# Patient Record
Sex: MALE | Race: WHITE | NOT HISPANIC OR LATINO | Employment: FULL TIME | URBAN - METROPOLITAN AREA
[De-identification: names, ages, dates, MRNs, and addresses within clinical notes are randomized per-mention and may not be internally consistent; named-entity substitution may affect disease eponyms.]

---
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
## 2018-01-21 ENCOUNTER — HEALTH MAINTENANCE LETTER (OUTPATIENT)
Age: 18
End: 2018-01-21

## 2018-05-21 ENCOUNTER — MEDICAL CORRESPONDENCE (OUTPATIENT)
Dept: HEALTH INFORMATION MANAGEMENT | Facility: CLINIC | Age: 18
End: 2018-05-21

## 2018-05-21 ENCOUNTER — TRANSFERRED RECORDS (OUTPATIENT)
Dept: HEALTH INFORMATION MANAGEMENT | Facility: CLINIC | Age: 18
End: 2018-05-21

## 2018-06-06 DIAGNOSIS — E66.01 MORBID OBESITY DUE TO EXCESS CALORIES (H): ICD-10-CM

## 2018-06-06 NOTE — TELEPHONE ENCOUNTER
Left message for patient to call and schedule a follow up appointment with Dr. Moss in order to receive refill of Phentermine. Scheduling number left.

## 2018-06-06 NOTE — TELEPHONE ENCOUNTER
phentermine 15 MG capsule  Last Written Prescription Date:  10/23/17  Last Fill Quantity: 30,   # refills: 5  Last Office Visit : 10/23/17  Future Office visit:  None scheduled    Routing refill request to provider for review/approval because:  Drug not on the [facility], [facility] or [facility] refill protocol or controlled substance

## 2018-06-11 RX ORDER — PHENTERMINE HYDROCHLORIDE 15 MG/1
CAPSULE ORAL
Qty: 30 CAPSULE | Refills: 0 | Status: SHIPPED | OUTPATIENT
Start: 2018-06-11 | End: 2023-06-01

## 2020-03-10 ENCOUNTER — HEALTH MAINTENANCE LETTER (OUTPATIENT)
Age: 20
End: 2020-03-10

## 2020-12-27 ENCOUNTER — HEALTH MAINTENANCE LETTER (OUTPATIENT)
Age: 20
End: 2020-12-27

## 2021-04-24 ENCOUNTER — HEALTH MAINTENANCE LETTER (OUTPATIENT)
Age: 21
End: 2021-04-24

## 2021-10-09 ENCOUNTER — HEALTH MAINTENANCE LETTER (OUTPATIENT)
Age: 21
End: 2021-10-09

## 2021-10-18 NOTE — LETTER
"3/18/2017       RE: Max Schneider  [address]  [place] [address]     Dear Colleague,    Thank you for referring your patient, Max Schneider, to the [facility] MEDICAL WEIGHT MANAGEMENT at [facility]. Please see a copy of my visit note below.        New Medical Weight Management Consult    PATIENT:  Max Schneider  MRN:         [id number]  :         2000  LUIS MANUEL:         3/18/2017    Dear Colleagues,    I had the pleasure of seeing your patient, Max Schneider.  Full intake/assessment done to determine barriers to weight loss success and develop a treatment plan.  Max Schneider is a 16 year old male interested in treatment of medical problems associated with weight.  His weight today is 419 lbs 0 oz, Body mass index is 54.53 kg/(m^2)., and he has the following co-morbidities:     3/18/2017   I have the following co-morbidities associated with obesity: None of the above       Patient Goals Reviewed With Patient 3/18/2017   I am interested in attaining a healthier weight to diminish current health problems related to co-morbid conditions: Yes   I am interested in attaining a healthier weight in order to prevent future health problems: Yes       Referring Provider 3/18/2017   Please name the provider who referred you to Medical Weight Management.  If you do not know, please answer: \"I Don't Know\". dilcia lowery       Wt Readings from Last 4 Encounters:   17 (!) 190.1 kg (419 lb) (>99 %)*     * Growth percentiles are based on CDC 2-20 Years data.       Weight History Reviewed With Patient 3/18/2017   How concerned are you about your weight? Somewhat Concerned   Would you describe your weight gain as gradual? Yes   I became overweight: As a Teenager   The following factors have contributed to my weight gain:  A Health Crisis/Stress, Eating Wrong Types of Food, Eating Too Much, Lack of Exercise, Genetic (Runs in the Family)   I have tried the following methods to lose " weight: Watching Portions or Calories, Exercise   I have the following family history of obesity/being overweight:  My father is overweight       Diet Recall Reviewed With Patient 3/18/2017   How many glasses of juice do you drink in a typical day? 6   How many of glasses of milk do you drink in a typical day? 2   How many 8oz glasses of sugar containing drinks such as Boris-Aid/sweet tea do you drink in a day? 0   How many cans/bottles of sugar pop/soda/tea/sports drinks do you drink in a day? 4   How many cans/bottles of diet pop/soda/tea or sports drink do you drink in a day? 2   How often do you have a drink of alcohol? Never       Eating Habits Reviewed With Patient 3/18/2017   Generally, my meals include foods like these: bread, pasta, rice, potatoes, corn, crackers, sweet dessert, pop, or juice. Almost Everyday   Generally, my meals include foods like these: fried meats, brats, burgers, french fries, pizza, cheese, chips, or ice cream. A Few Times a Week   Eat fast food (like [employer], [employer], [employer]). A Few Times a Week   Eat at a buffet or sit-down restaurant. Never   Eat most of my meals in front of the TV or computer. Everyday   Often skip meals, eat at random times, have no regular eating times. Almost Everyday   Rarely sit down for a meal but snack or graze throughout.  Everyday   Eat extra snacks between meals. Almost Everyday   Eat most of my food at the end of the day. A Few Times a Week   Eat in the middle of the night or wake up at night to eat. Never   Eat extra snacks to prevent or correct low blood sugar. Never   Eat to prevent acid reflux or stomach pain. Never   Worry about not having enough food to eat. Never   Have you been to the food shelf at least a few times this year? No   I eat when I am depressed, stressed, anxious, or bored. Everyday   I eat when I am happy or as a reward. A Few Times a Week   I feel hungry all the time even if I just have eaten. Half of the Week   Feeling  full is important to me. Never   Once I start eating, it is hard to stop. Almost Everyday   I finish all the food on my plate even if I am already full. Everyday   I can't resist eating delicious food or walk past the good food/smell. Never   I eat/snack without noticing that I am eating. Almost Everyday   I eat when I am preparing the meal. Almost Everyday   I eat more than usual when I see others eating. Never   I have trouble not eating sweets, ice cream, cookies, or chips if they are around the house. Never   I think about food all day. Never   Please list any other foods you crave? spicy food   I feel out of control when eating. Monthly   I eat a large amount of food, like a loaf of bread, a box of cookies, a pint/quart of ice cream, all at once. Never   I eat a large amount of food even when I am not hungry. Everyday   I eat rapidly. Everyday   I eat alone because I feel embarrassed and do not want others to see how much I have eaten. Never   I eat until I am uncomfortably full. Almost Everyday   I feel bad, disgusted, or guilty after I overeat. Monthly   I make myself vomit what I have eaten or use laxatives to get rid of food. Never       Activity/Exercise History Reviewed With Patient 3/18/2017   How much of a typical 12 hour day do you spend sitting? Most of the Day   How much of a typical 12 hour day do you spend lying down? Less Than Half the Day   How much of a typical day do you spend walking/standing? Less Than Half the Day   How many hours (not including work) do you spend on the TV/Video Games/Computer/Tablet/Phone? 6 Hours or More       PAST MEDICAL HISTORY:  No past medical history on file.    Work/Social History Reviewed With Patient 3/18/2017   My employment status is: Part-Time   My job is:    How much of your job is spent on the computer or phone? Less Than 50%   What is your marital status? Single   If in a relationship, is your significant other overweight? N/A   Do you have  "children? No   If you have children, are they overweight? N/A       Mental Health History Reviewed With Patient 3/18/2017   Have you ever been physically or sexually abused? No   How often in the past 2 weeks have you felt little interest or pleasure in doing things? More Than Half the Days   Over the past 2 weeks how often have you felt down, depressed, or hopeless? Nearly Everyday       No flowsheet data found.    MEDICATIONS:   No current outpatient prescriptions on file.       ALLERGIES:   No Known Allergies    PHYSICAL EXAM:  BP (!) 144/97  Pulse 98  Ht 1.867 m (6' 1.5\")  Wt (!) 190.1 kg (419 lb)  SpO2 96%  BMI 54.53 kg/m2   A & O x 3  HEENT: NCAT, mucous membranes moist  Respirations unlabored  Location of obesity: Central Obesity    ASSESSMENT:  Max is a patient with early onset super morbid obesity with significant element of familial/genetic influence and without current health consequences. He does need aggressive weight loss plan due to very severe weight.      Max Schneider eats a high carb diet, eats a high fat diet, uses food as mood management, eats most meals in front of TV, tends to snack/graze throughout day, rarely sitting to eat a true meal, has a disorganized meal pattern and drinks many calories.    His problem is complicated by a hunger disorder and unhelpful lifestyle choices    His ability to lose weight is impacted by lack of confidence.    PLAN:    Volumetrics eating plan  Meal planning - stop liquid calories, work on not snacking    Strong genetic or hunger disorder  Ancillary testing:  N/A.  Food Plan:  Volumetrics.  Activity Plan:  Activity journal.  Supplementary:  N/A.  Medication:  The patient will begin medication in pursuit of improved medical status as influenced by body weight. He will start Qsymia.  There is a mutual understanding of the goals and risks of this therapy. The patient is in agreement. He is educated on dosage regimen and possible side effects.    RTC:    12 " weeks.  30/45 minutes spent on counseling and education    Sincerely,    Alonso Moss MD           Improved.

## 2022-02-14 ENCOUNTER — VIRTUAL VISIT (OUTPATIENT)
Dept: ENDOCRINOLOGY | Facility: CLINIC | Age: 22
End: 2022-02-14
Payer: COMMERCIAL

## 2022-02-14 VITALS — HEIGHT: 75 IN | BODY MASS INDEX: 39.17 KG/M2 | WEIGHT: 315 LBS

## 2022-02-14 DIAGNOSIS — E66.01 MORBID OBESITY (H): Primary | ICD-10-CM

## 2022-02-14 PROCEDURE — 99203 OFFICE O/P NEW LOW 30 MIN: CPT | Mod: GT | Performed by: INTERNAL MEDICINE

## 2022-02-14 RX ORDER — PHENTERMINE HYDROCHLORIDE 15 MG/1
15 CAPSULE ORAL EVERY MORNING
Qty: 30 CAPSULE | Refills: 5 | Status: SHIPPED | OUTPATIENT
Start: 2022-02-14 | End: 2022-05-16

## 2022-02-14 RX ORDER — TOPIRAMATE 25 MG/1
TABLET, FILM COATED ORAL
Qty: 90 TABLET | Refills: 11 | Status: SHIPPED | OUTPATIENT
Start: 2022-02-14 | End: 2023-08-28

## 2022-02-14 ASSESSMENT — PAIN SCALES - GENERAL: PAINLEVEL: NO PAIN (0)

## 2022-02-14 ASSESSMENT — MIFFLIN-ST. JEOR: SCORE: 3416.65

## 2022-02-14 NOTE — LETTER
2022       RE: Max Schneider  [address] [place] [address]     Dear Colleague,    Thank you for referring your patient, Max Schneider, to the [facility] at [facility]. Please see a copy of my visit note below.    Max is a 21 year old who is being evaluated via a billable video visit.      How would you like to obtain your AVS? Mail a copy  If the video visit is dropped, the invitation should be resent by: Send to e-mail at: [email or web address]  Will anyone else be joining your video visit? No    Video-Visit Details    Type of service:  Video Visit    Start: 2022 11:05 am  Stop: 2022 11:28 am    Originating Location (pt. Location): Home    Distant Location (provider location):  [facility]     Platform used for Video Visit: [employer]        [facility] Weight Management Consult    PATIENT:  Max Schneider  MRN:         [id number]  :         2000  LUIS MANUEL:         2022    Dear Farzaneh James PA-C,    I had the pleasure of seeing your patient, Max Schneider.  Full intake/assessment done to determine barriers to weight loss success and develop a treatment plan.  Max Schneider is a 21 year old male interested in treatment of medical problems associated with weight.  His weight today is 515 lbs 0 oz, Body mass index is 64.37 kg/m ., and he has the following co-morbidities:     2022   I have the following health issues associated with obesity: High Blood Pressure, Sleep Apnea, Lymphedema   I have the following symptoms associated with obesity: Depression, Fatigue       Patient Goals 2022   I am interested in having a healthier weight to diminish current health problems: Yes   I am interested in having a healthier weight in order to prevent future health problems: Yes   I am interested in having a healthier weight in order to have a future surgery:  "No       Referring Provider 2/13/2022   Please name the provider who referred you to Medical Weight Management.  If you do not know, please answer: \"I Don't Know\". Farzaneh James       Wt Readings from Last 4 Encounters:   02/14/22 (!) 233.6 kg (515 lb)   10/23/17 (!) 174.6 kg (385 lb) (>99 %, Z= 3.78)*   03/18/17 (!) 190.1 kg (419 lb) (>99 %, Z= 3.90)*     * Growth percentiles are based on [facility] (Boys, 2-20 Years) data.       Weight History 2/13/2022   How concerned are you about your weight? Very Concerned   Would you describe your weight gain as gradual? Yes   I became overweight: As a Teenager   The following factors have contributed to my weight gain:  Mental Health Issues, Eating Wrong Types of Food, Eating Too Much, Lack of Exercise, Genetic (Runs in the Family), Stress   I have tried the following methods to lose weight: Watching Portions or Calories, Exercise, Medications, Fasting   Please list the medications you have tried.  Phentermine and Topiramate   My lowest weight since age 16 was: 330   My highest weight since age 16 was: 514   The most weight I have ever lost was: (lbs) 514   I have the following family history of obesity/being overweight:  My father is overweight   Has anyone in your family had weight loss surgery? No   How has your weight changed over the last year?  Gained   How many pounds? 50-75       Diet Recall 2/13/2022   For breakfast, I typically eat: -   For lunch, I typically eat: -   For supper, I typically eat: -   For snack(s), I typically eat: -   Glass juice/day 2   Glass milk/day 0   Glass sugary drink/day 1   How many cans/bottles of sugar pop/soda/tea/sports drinks do you drink in a day? 2   Diet drink/day -   Alcohol Monthly or Less   Drinks/day 3-4       Eating Habits 2/13/2022   Generally, my meals include foods like these: bread, pasta, rice, potatoes, corn, crackers, sweet dessert, pop, or juice. A Few Times a Week   Generally, my meals include foods like these: fried meats, " brats, burgers, french fries, pizza, cheese, chips, or ice cream. A Few Times a Week   Eat fast food (like [employer], [employer], Taco Bell). Less Than Weekly   Eat at a buffet or sit-down restaurant. A Few Times a Week   Eat most of my meals in front of the TV or computer. Everyday   Often skip meals, eat at random times, have no regular eating times. A Few Times a Week   Rarely sit down for a meal but snack or graze throughout.  A Few Times a Week   Eat extra snacks between meals. A Few Times a Week   Eat most of my food at the end of the day. A Few Times a Week   Eat in the middle of the night or wake up at night to eat. Never   Eat extra snacks to prevent or correct low blood sugar. Never   Eat to prevent acid reflux or stomach pain. Never   Worry about not having enough food to eat. Never   Have you been to the food shelf at least a few times this year? Yes   I eat when I am depressed. A Few Times a Week   I eat when I am stressed. Less Than Weekly   I eat when I am bored. A Few Times a Week   I eat when I am anxious. Never   I eat when I am happy or as a reward. A Few Times a Week   I feel hungry all the time even if I just have eaten. Once a Week   Feeling full is important to me. Less Than Weekly   I finish all the food on my plate even if I am already full. Almost Everyday   I can't resist eating delicious food or walk past the good food/smell. Once a Week   I eat/snack without noticing that I am eating. Never   I eat when I am preparing the meal. Never   I eat more than usual when I see others eating. Less Than Weekly   I have trouble not eating sweets, ice cream, cookies, or chips if they are around the house. Never   I think about food all day. Less Than Weekly   What foods, if any, do you crave? None   Please list any other foods you crave? -       Activity/Exercise History 2/13/2022   How much of a typical 12 hour day do you spend sitting? Most of the Day   How much of a typical 12 hour day do you  spend lying down? Less Than Half the Day   How much of a typical day do you spend walking/standing? Less Than Half the Day   How many hours (not including work) do you spend on the TV/Video Games/Computer/Tablet/Phone? 6 Hours or More   How many times a week are you active for the purpose of exercise? Never   What keeps you from being more active? Shortness of Breath, Unsure What To Do, Worried People Will Look At Me   How many total minutes do you spend doing some activity for the purpose of exercising when you exercise? More Than 30 Minutes       PAST MEDICAL HISTORY:  No past medical history on file.    Work/Social History Reviewed With Patient 2/13/2022   My employment status is: Full-Time   My job is:    How much of your job is spent on the computer or phone? 50%   How many hours do you spend commuting to work daily?  12-15 minutes.   What is your marital status? Single   If in a relationship, is your significant other overweight? -   Do you have children? No   If you have children, are they overweight? -   Who do you live with?  My mother and step-father   Are they supportive of your health goals? No   Who does the food shopping?  We all do, we take turns purchasing foods. I am the only person who tries to buy healthier items.       Mental Health History Reviewed With Patient 2/13/2022   Have you ever been physically or sexually abused? No   If yes, do you feel that the abuse is affecting your weight? -   If yes, would you like to talk to a counselor about the abuse? -   How often in the past 2 weeks have you felt little interest or pleasure in doing things? Nearly Everyday   Over the past 2 weeks how often have you felt down, depressed, or hopeless? More Than Half the Days       Sleep History Reviewed With Patient 2/13/2022   How many hours do you sleep at night? 6   Do you think that you snore loudly or has anybody ever heard you snore loudly (louder than talking or so loud it can be heard  "behind a shut door)? Yes   Has anyone seen or heard you stop breathing during your sleep? Yes   Do you often feel tired, fatigued, or sleepy during the day? Yes   Do you have a TV/Computer in your bedroom? Yes       MEDICATIONS:   Current Outpatient Medications   Medication Sig Dispense Refill     phentermine 15 MG capsule TAKE 1 CAPSULE BY MOUTH EVERY MORNING (Patient not taking: Reported on 2/14/2022) 30 capsule 0     topiramate (TOPAMAX) 25 MG tablet Take 3 tablets (75 mg) by mouth daily (Patient not taking: Reported on 2/14/2022) 90 tablet 5       ALLERGIES:   No Known Allergies    PHYSICAL EXAM:  Ht 1.905 m (6' 3\")   Wt (!) 233.6 kg (515 lb)   BMI 64.37 kg/m     A & O x 3  HEENT: NCAT, mucous membranes moist  Respirations unlabored  Location of obesity: Mixed Obesity    ASSESSMENT:  Max is a patient with mature onset severe morbid obesity with significant element of familial/genetic influence and with current health consequences. He does need aggressive weight loss plan due to High Blood Pressure, Sleep Apnea, Lymphedema.  Last seen 2017, was then on phentermine and topiramate and lost 100 lb. Stopped due to insurance decision that his health was not important, so no coverage.    Max Schneider eats a high carb diet, eats a high fat diet, eats fast food once or more per week and has a disorganized meal pattern.    His problem is complicated by a hunger disorder and strong craving/reward pathways    His ability to lose weight is impacted by lack of confidence.    PLAN:    Volumetrics eating plan  Meal planning    Strong genetic or hunger disorder  Ancillary testing:  N/A.  Food Plan:  Volumetrics and High protein/low carbohydrate.  Activity Plan:  Activity journal.  Supplementary:  N/A.  Medication:  The patient will begin medication in pursuit of improved medical status as influenced by body weight. He will start phentermine and topiramate. Patient was made aware that topiramate is not approved for the " treatment of obesity.  There is a mutual understanding of the goals and risks of this therapy. The patient is in agreement. He is educated on dosage regimen and possible side effects.    RTC:    12 weeks.    Sincerely,  Alonso Moss MD

## 2022-02-14 NOTE — NURSING NOTE
"(   Chief Complaint   Patient presents with     New Patient     New weight management consult.    )    ( Weight: (!) 515 lb )  ( Height: 6' 3\" )  ( BMI (Calculated): 64.37 )  (   )  ( Cumulative weight loss (lbs): 0 )  (   )  (   )  (   )  (   )  ( There is no problem list on file for this patient.   )  (   Current Outpatient Medications   Medication Sig Dispense Refill     phentermine 15 MG capsule TAKE 1 CAPSULE BY MOUTH EVERY MORNING (Patient not taking: Reported on 2/14/2022) 30 capsule 0     topiramate (TOPAMAX) 25 MG tablet Take 3 tablets (75 mg) by mouth daily (Patient not taking: Reported on 2/14/2022) 90 tablet 5    )  ( Diabetes Eval:    )    ( Pain Eval:  No Pain (0) )    ( Wound Eval:       )    (   History   Smoking Status     Never Smoker   Smokeless Tobacco     Never Used    )    ( Signed By:  Lenora Duncan CMA; February 14, 2022; 10:38 AM )    "

## 2022-02-14 NOTE — PROGRESS NOTES
"    New Medical Weight Management Consult    PATIENT:  Max Schneider  MRN:         [id number]  :         2000  LUIS MANUEL:         2022    Dear Farzaneh James PA-C,    I had the pleasure of seeing your patient, Max Schneider.  Full intake/assessment done to determine barriers to weight loss success and develop a treatment plan.  Max Schneider is a 21 year old male interested in treatment of medical problems associated with weight.  His weight today is 515 lbs 0 oz, Body mass index is 64.37 kg/m ., and he has the following co-morbidities:     2022   I have the following health issues associated with obesity: High Blood Pressure, Sleep Apnea, Lymphedema   I have the following symptoms associated with obesity: Depression, Fatigue       Patient Goals 2022   I am interested in having a healthier weight to diminish current health problems: Yes   I am interested in having a healthier weight in order to prevent future health problems: Yes   I am interested in having a healthier weight in order to have a future surgery: No       Referring Provider 2022   Please name the provider who referred you to Medical Weight Management.  If you do not know, please answer: \"I Don't Know\". Farzaneh Jmaes       Wt Readings from Last 4 Encounters:   20 (!) 233.6 kg (515 lb)   10/23/17 (!) 174.6 kg (385 lb) (>99 %, Z= 3.78)*   17 (!) 190.1 kg (419 lb) (>99 %, Z= 3.90)*     * Growth percentiles are based on CDC (Boys, 2-20 Years) data.       Weight History 2022   How concerned are you about your weight? Very Concerned   Would you describe your weight gain as gradual? Yes   I became overweight: As a Teenager   The following factors have contributed to my weight gain:  Mental Health Issues, Eating Wrong Types of Food, Eating Too Much, Lack of Exercise, Genetic (Runs in the Family), Stress   I have tried the following methods to lose weight: Watching Portions or Calories, Exercise, Medications, Fasting   Please " list the medications you have tried.  Phentermine and Topiramate   My lowest weight since age 16 was: 330   My highest weight since age 16 was: 514   The most weight I have ever lost was: (lbs) 514   I have the following family history of obesity/being overweight:  My father is overweight   Has anyone in your family had weight loss surgery? No   How has your weight changed over the last year?  Gained   How many pounds? 50-75       Diet Recall 2/13/2022   For breakfast, I typically eat: -   For lunch, I typically eat: -   For supper, I typically eat: -   For snack(s), I typically eat: -   Glass juice/day 2   Glass milk/day 0   Glass sugary drink/day 1   How many cans/bottles of sugar pop/soda/tea/sports drinks do you drink in a day? 2   Diet drink/day -   Alcohol Monthly or Less   Drinks/day 3-4       Eating Habits 2/13/2022   Generally, my meals include foods like these: bread, pasta, rice, potatoes, corn, crackers, sweet dessert, pop, or juice. A Few Times a Week   Generally, my meals include foods like these: fried meats, brats, burgers, french fries, pizza, cheese, chips, or ice cream. A Few Times a Week   Eat fast food (like [employer], [employer], Taco Bell). Less Than Weekly   Eat at a buffet or sit-down restaurant. A Few Times a Week   Eat most of my meals in front of the TV or computer. Everyday   Often skip meals, eat at random times, have no regular eating times. A Few Times a Week   Rarely sit down for a meal but snack or graze throughout.  A Few Times a Week   Eat extra snacks between meals. A Few Times a Week   Eat most of my food at the end of the day. A Few Times a Week   Eat in the middle of the night or wake up at night to eat. Never   Eat extra snacks to prevent or correct low blood sugar. Never   Eat to prevent acid reflux or stomach pain. Never   Worry about not having enough food to eat. Never   Have you been to the food shelf at least a few times this year? Yes   I eat when I am depressed. A  Few Times a Week   I eat when I am stressed. Less Than Weekly   I eat when I am bored. A Few Times a Week   I eat when I am anxious. Never   I eat when I am happy or as a reward. A Few Times a Week   I feel hungry all the time even if I just have eaten. Once a Week   Feeling full is important to me. Less Than Weekly   I finish all the food on my plate even if I am already full. Almost Everyday   I can't resist eating delicious food or walk past the good food/smell. Once a Week   I eat/snack without noticing that I am eating. Never   I eat when I am preparing the meal. Never   I eat more than usual when I see others eating. Less Than Weekly   I have trouble not eating sweets, ice cream, cookies, or chips if they are around the house. Never   I think about food all day. Less Than Weekly   What foods, if any, do you crave? None   Please list any other foods you crave? -       Activity/Exercise History 2/13/2022   How much of a typical 12 hour day do you spend sitting? Most of the Day   How much of a typical 12 hour day do you spend lying down? Less Than Half the Day   How much of a typical day do you spend walking/standing? Less Than Half the Day   How many hours (not including work) do you spend on the TV/Video Games/Computer/Tablet/Phone? 6 Hours or More   How many times a week are you active for the purpose of exercise? Never   What keeps you from being more active? Shortness of Breath, Unsure What To Do, Worried People Will Look At Me   How many total minutes do you spend doing some activity for the purpose of exercising when you exercise? More Than 30 Minutes       PAST MEDICAL HISTORY:  No past medical history on file.    Work/Social History Reviewed With Patient 2/13/2022   My employment status is: Full-Time   My job is:    How much of your job is spent on the computer or phone? 50%   How many hours do you spend commuting to work daily?  12-15 minutes.   What is your marital status? Single   If  "in a relationship, is your significant other overweight? -   Do you have children? No   If you have children, are they overweight? -   Who do you live with?  My mother and step-father   Are they supportive of your health goals? No   Who does the food shopping?  We all do, we take turns purchasing foods. I am the only person who tries to buy healthier items.       Mental Health History Reviewed With Patient 2/13/2022   Have you ever been physically or sexually abused? No   If yes, do you feel that the abuse is affecting your weight? -   If yes, would you like to talk to a counselor about the abuse? -   How often in the past 2 weeks have you felt little interest or pleasure in doing things? Nearly Everyday   Over the past 2 weeks how often have you felt down, depressed, or hopeless? More Than Half the Days       Sleep History Reviewed With Patient 2/13/2022   How many hours do you sleep at night? 6   Do you think that you snore loudly or has anybody ever heard you snore loudly (louder than talking or so loud it can be heard behind a shut door)? Yes   Has anyone seen or heard you stop breathing during your sleep? Yes   Do you often feel tired, fatigued, or sleepy during the day? Yes   Do you have a TV/Computer in your bedroom? Yes       MEDICATIONS:   Current Outpatient Medications   Medication Sig Dispense Refill     phentermine 15 MG capsule TAKE 1 CAPSULE BY MOUTH EVERY MORNING (Patient not taking: Reported on 2/14/2022) 30 capsule 0     topiramate (TOPAMAX) 25 MG tablet Take 3 tablets (75 mg) by mouth daily (Patient not taking: Reported on 2/14/2022) 90 tablet 5       ALLERGIES:   No Known Allergies    PHYSICAL EXAM:  Ht 1.905 m (6' 3\")   Wt (!) 233.6 kg (515 lb)   BMI 64.37 kg/m     A & O x 3  HEENT: NCAT, mucous membranes moist  Respirations unlabored  Location of obesity: Mixed Obesity    ASSESSMENT:  Max is a patient with mature onset severe morbid obesity with significant element of familial/genetic " influence and with current health consequences. He does need aggressive weight loss plan due to High Blood Pressure, Sleep Apnea, Lymphedema.  Last seen 2017, was then on phentermine and topiramate and lost 100 lb. Stopped due to insurance decision that his health was not important, so no coverage.    Max Schneider eats a high carb diet, eats a high fat diet, eats fast food once or more per week and has a disorganized meal pattern.    His problem is complicated by a hunger disorder and strong craving/reward pathways    His ability to lose weight is impacted by lack of confidence.    PLAN:    Volumetrics eating plan  Meal planning    Strong genetic or hunger disorder  Ancillary testing:  N/A.  Food Plan:  Volumetrics and High protein/low carbohydrate.  Activity Plan:  Activity journal.  Supplementary:  N/A.  Medication:  The patient will begin medication in pursuit of improved medical status as influenced by body weight. He will start phentermine and topiramate. Patient was made aware that topiramate is not approved for the treatment of obesity.  There is a mutual understanding of the goals and risks of this therapy. The patient is in agreement. He is educated on dosage regimen and possible side effects.    RTC:    12 weeks.    Sincerely,  Alonso Moss MD

## 2022-02-14 NOTE — PROGRESS NOTES
Max is a 21 year old who is being evaluated via a billable video visit.      How would you like to obtain your AVS? Mail a copy  If the video visit is dropped, the invitation should be resent by: Send to e-mail at: [email or web address]  Will anyone else be joining your video visit? No    Video-Visit Details    Type of service:  Video Visit    Start: 02/14/2022 11:05 am  Stop: 02/14/2022 11:28 am    Originating Location (pt. Location): Home    Distant Location (provider location):  [facility] [place]     Platform used for Video Visit: [employer]

## 2022-05-16 ENCOUNTER — VIRTUAL VISIT (OUTPATIENT)
Dept: ENDOCRINOLOGY | Facility: CLINIC | Age: 22
End: 2022-05-16
Payer: COMMERCIAL

## 2022-05-16 VITALS — BODY MASS INDEX: 39.17 KG/M2 | WEIGHT: 315 LBS | HEIGHT: 75 IN

## 2022-05-16 DIAGNOSIS — E66.01 MORBID OBESITY (H): ICD-10-CM

## 2022-05-16 PROCEDURE — 99213 OFFICE O/P EST LOW 20 MIN: CPT | Mod: GT | Performed by: INTERNAL MEDICINE

## 2022-05-16 RX ORDER — PHENTERMINE HYDROCHLORIDE 15 MG/1
15 CAPSULE ORAL EVERY MORNING
Qty: 30 CAPSULE | Refills: 5 | Status: SHIPPED | OUTPATIENT
Start: 2022-05-16 | End: 2023-06-01

## 2022-05-16 ASSESSMENT — PAIN SCALES - GENERAL: PAINLEVEL: NO PAIN (0)

## 2022-05-16 NOTE — NURSING NOTE
"(   Chief Complaint   Patient presents with     RECHECK     Return MW    )    ( Weight: (!) 208.2 kg (459 lb) (Patient reported) )  ( Height: 190.5 cm (6' 3\") )  ( BMI (Calculated): 57.37 )  (   )  (   )  (   )  (   )  (   )  (   )    (   )  (   )  (   )  (   )  (   )  (   )  (   )    (   Patient Active Problem List   Diagnosis     Morbid obesity (H)    )  (   Current Outpatient Medications   Medication Sig Dispense Refill     loratadine (CLARITIN) 5 MG chewable tablet Take 5 mg by mouth daily       phentermine (ADIPEX-P) 15 MG capsule Take 1 capsule (15 mg) by mouth every morning 30 capsule 5     topiramate (TOPAMAX) 25 MG tablet 25 mg at bedtime for 1 week, 50 mg at bedtime for 1 week and 75 mg daily at bedtime thereafter 90 tablet 11     phentermine 15 MG capsule TAKE 1 CAPSULE BY MOUTH EVERY MORNING (Patient not taking: No sig reported) 30 capsule 0     topiramate (TOPAMAX) 25 MG tablet Take 3 tablets (75 mg) by mouth daily (Patient not taking: No sig reported) 90 tablet 5    )  ( Diabetes Eval:    )    ( Pain Eval:  No Pain (0) )    ( Wound Eval:       )    (   History   Smoking Status     Never Smoker   Smokeless Tobacco     Never Used    )    ( Signed By:  Shona Roach, EMT; May 16, 2022; 10:59 AM )    "

## 2022-05-16 NOTE — LETTER
"2022       RE: Max Schneider  [address] [place] [address]     Dear Colleague,    Thank you for referring your patient, Max Schneider, to the [facility] [place] at [facility]. Please see a copy of my visit note below.    Max is a 21 year old who is being evaluated via a billable video visit.      How would you like to obtain your AVS? MyChart  If the video visit is dropped, the invitation should be resent by: [phone]  Will anyone else be joining your video visit? No  During this virtual visit the patient is located in [place], patient verifies this as the location during the entirety of this visit.     Video-Visit Details    Type of service:  Video Visit    Start: 2022 11:40 am  Stop: 2022 11:54 am    Originating Location (pt. Location): Home    Distant Location (provider location):  [facility] [place]     Platform used for Video Visit: [employer] Medical Weight Management Note     Max Schneider  MRN:  [id number]  :  2000  LUIS MANUEL:  2022    Dear Farzaneh James PA-C,    I had the pleasure of seeing your patient Max Schneider.  He is a 21 year old male who I am continuing to see for treatment of obesity related to:     2022   I have the following health issues associated with obesity: High Blood Pressure, Sleep Apnea, Lymphedema   I have the following symptoms associated with obesity: Depression, Fatigue     CURRENT WEIGHT:   459 lbs 0 oz    Wt Readings from Last 4 Encounters:   22 (!) 208.2 kg (459 lb)   22 (!) 233.6 kg (515 lb)   10/23/17 (!) 174.6 kg (385 lb) (>99 %, Z= 3.78)*   17 (!) 190.1 kg (419 lb) (>99 %, Z= 3.90)*     * Growth percentiles are based on CDC (Boys, 2-20 Years) data.     Height:  6' 3\"  Body Mass Index:  Body mass index is 57.37 kg/m .  Vitals:  B/P: Data Unavailable, P: Data Unavailable    Initial consult weight " was 515 on 2/14/22.  Weight change since last seen on 2/14/2022 is down 56 pounds.   Total loss is 56 pounds.    INTERVAL HISTORY:  Feels he has done very well, changed his food life as discussed - low starches and cheese, more veg. Taking and tolerating meds and feels substantial benefit.    Diet Recall Review with Patient 2/13/2022   Do you typically eat breakfast? Yes   If you do eat breakfast, what types of food do you eat? [place] or leftovers   Do you typically eat lunch? Yes   If you do eat lunch, what types of food do you typically eat?  I usually order, it consists of a lot of different types. Usually I prefer Mexican Food.   Do you typically eat supper? Yes   If you do eat supper, what types of food do you typically eat? Something involving meat, either chicken or beef of some sort alongside a potential potato side dish. Also sometimes pasta.   Do you typically eat snacks? Yes   If you do snack, what types of food do you typically eat? I try to keep snacking healthy, but I like sun chips or fruit snacks.   Do you like vegetables?  Yes   Do you drink water? Yes   How many glasses of juice do you drink in a typical day? 2   How many of glasses of milk do you drink in a typical day? 0   If you do drink milk, what type? Whole   How many 8oz glasses of sugar containing drinks such as Boris-Aid/sweet tea do you drink in a day? 1   How many cans/bottles of sugar pop/soda/tea/sports drinks do you drink in a day? 2   How many cans/bottles of diet pop/soda/tea or sports drink do you drink in a day? -   How often do you have a drink of alcohol? Monthly or Less   If you do drink, how many drinks might you have in a day? 3-4     MEDICATIONS:   Current Outpatient Medications   Medication     loratadine (CLARITIN) 5 MG chewable tablet     phentermine (ADIPEX-P) 15 MG capsule     topiramate (TOPAMAX) 25 MG tablet     phentermine 15 MG capsule     topiramate (TOPAMAX) 25 MG tablet     No current facility-administered  medications for this visit.     Weight Loss Medication History Reviewed With Patient 5/16/2022   Which weight loss medications are you currently taking on a regular basis?  Phentermine, Topamax (topiramate)   Are you having any side effects from the weight loss medication that we have prescribed you? No     ASSESSMENT:   Maintain phentermine 15/d and topiramate 75 HS at current doses, encourage food life change.    FOLLOW-UP:    12 weeks.    Sincerely,  Alonso Moss MD

## 2022-05-16 NOTE — PROGRESS NOTES
"    Return Medical Weight Management Note     Max Schneider  MRN:  [id number]  :  2000  LUIS MANUEL:  2022    Dear Farzaneh James PA-C,    I had the pleasure of seeing your patient Max Schneider.  He is a 21 year old male who I am continuing to see for treatment of obesity related to:     2022   I have the following health issues associated with obesity: High Blood Pressure, Sleep Apnea, Lymphedema   I have the following symptoms associated with obesity: Depression, Fatigue     CURRENT WEIGHT:   459 lbs 0 oz    Wt Readings from Last 4 Encounters:   22 (!) 208.2 kg (459 lb)   22 (!) 233.6 kg (515 lb)   10/23/17 (!) 174.6 kg (385 lb) (>99 %, Z= 3.78)*   17 (!) 190.1 kg (419 lb) (>99 %, Z= 3.90)*     * Growth percentiles are based on CDC (Boys, 2-20 Years) data.     Height:  6' 3\"  Body Mass Index:  Body mass index is 57.37 kg/m .  Vitals:  B/P: Data Unavailable, P: Data Unavailable    Initial consult weight was 515 on 22.  Weight change since last seen on 2022 is down 56 pounds.   Total loss is 56 pounds.    INTERVAL HISTORY:  Feels he has done very well, changed his food life as discussed - low starches and cheese, more veg. Taking and tolerating meds and feels substantial benefit.    Diet Recall Review with Patient 2022   Do you typically eat breakfast? Yes   If you do eat breakfast, what types of food do you eat? [place] or leftovers   Do you typically eat lunch? Yes   If you do eat lunch, what types of food do you typically eat?  I usually order, it consists of a lot of different types. Usually I prefer Mexican Food.   Do you typically eat supper? Yes   If you do eat supper, what types of food do you typically eat? Something involving meat, either chicken or beef of some sort alongside a potential potato side dish. Also sometimes pasta.   Do you typically eat snacks? Yes   If you do snack, what types of food do you typically eat? I try to keep snacking healthy, but I like " sun chips or fruit snacks.   Do you like vegetables?  Yes   Do you drink water? Yes   How many glasses of juice do you drink in a typical day? 2   How many of glasses of milk do you drink in a typical day? 0   If you do drink milk, what type? Whole   How many 8oz glasses of sugar containing drinks such as Boris-Aid/sweet tea do you drink in a day? 1   How many cans/bottles of sugar pop/soda/tea/sports drinks do you drink in a day? 2   How many cans/bottles of diet pop/soda/tea or sports drink do you drink in a day? -   How often do you have a drink of alcohol? Monthly or Less   If you do drink, how many drinks might you have in a day? 3-4     MEDICATIONS:   Current Outpatient Medications   Medication     loratadine (CLARITIN) 5 MG chewable tablet     phentermine (ADIPEX-P) 15 MG capsule     topiramate (TOPAMAX) 25 MG tablet     phentermine 15 MG capsule     topiramate (TOPAMAX) 25 MG tablet     No current facility-administered medications for this visit.     Weight Loss Medication History Reviewed With Patient 5/16/2022   Which weight loss medications are you currently taking on a regular basis?  Phentermine, Topamax (topiramate)   Are you having any side effects from the weight loss medication that we have prescribed you? No     ASSESSMENT:   Maintain phentermine 15/d and topiramate 75 HS at current doses, encourage food life change.    FOLLOW-UP:    12 weeks.    Sincerely,  Alonso Moss MD

## 2022-05-16 NOTE — PROGRESS NOTES
Max is a 21 year old who is being evaluated via a billable video visit.      How would you like to obtain your AVS? MyChart  If the video visit is dropped, the invitation should be resent by: [phone]  Will anyone else be joining your video visit? No  During this virtual visit the patient is located in [place], patient verifies this as the location during the entirety of this visit.     Video-Visit Details    Type of service:  Video Visit    Start: 05/16/2022 11:40 am  Stop: 05/16/2022 11:54 am    Originating Location (pt. Location): Home    Distant Location (provider location):  [facility] [place]     Platform used for Video Visit: [employer]

## 2022-05-21 ENCOUNTER — HEALTH MAINTENANCE LETTER (OUTPATIENT)
Age: 22
End: 2022-05-21

## 2022-09-11 ENCOUNTER — HEALTH MAINTENANCE LETTER (OUTPATIENT)
Age: 22
End: 2022-09-11

## 2022-11-03 DIAGNOSIS — G47.33 OSA (OBSTRUCTIVE SLEEP APNEA): Primary | ICD-10-CM

## 2023-03-16 ENCOUNTER — VIRTUAL VISIT (OUTPATIENT)
Dept: SLEEP MEDICINE | Facility: CLINIC | Age: 23
End: 2023-03-16
Payer: COMMERCIAL

## 2023-03-16 VITALS — BODY MASS INDEX: 39.17 KG/M2 | HEIGHT: 75 IN | WEIGHT: 315 LBS

## 2023-03-16 DIAGNOSIS — G47.30 SLEEP-RELATED BREATHING DISORDER: Primary | ICD-10-CM

## 2023-03-16 DIAGNOSIS — G47.33 OSA (OBSTRUCTIVE SLEEP APNEA): ICD-10-CM

## 2023-03-16 PROCEDURE — 99205 OFFICE O/P NEW HI 60 MIN: CPT | Mod: VID | Performed by: NURSE PRACTITIONER

## 2023-03-16 RX ORDER — ZOLPIDEM TARTRATE 10 MG/1
TABLET ORAL
Qty: 1 TABLET | Refills: 0 | Status: SHIPPED | OUTPATIENT
Start: 2023-03-16 | End: 2023-11-21

## 2023-03-16 ASSESSMENT — SLEEP AND FATIGUE QUESTIONNAIRES
HOW LIKELY ARE YOU TO NOD OFF OR FALL ASLEEP WHILE WATCHING TV: SLIGHT CHANCE OF DOZING
HOW LIKELY ARE YOU TO NOD OFF OR FALL ASLEEP WHILE SITTING INACTIVE IN A PUBLIC PLACE: WOULD NEVER DOZE
HOW LIKELY ARE YOU TO NOD OFF OR FALL ASLEEP WHILE SITTING QUIETLY AFTER LUNCH WITHOUT ALCOHOL: WOULD NEVER DOZE
HOW LIKELY ARE YOU TO NOD OFF OR FALL ASLEEP WHEN YOU ARE A PASSENGER IN A CAR FOR AN HOUR WITHOUT A BREAK: SLIGHT CHANCE OF DOZING
HOW LIKELY ARE YOU TO NOD OFF OR FALL ASLEEP WHILE SITTING AND READING: MODERATE CHANCE OF DOZING
HOW LIKELY ARE YOU TO NOD OFF OR FALL ASLEEP WHILE SITTING AND TALKING TO SOMEONE: WOULD NEVER DOZE
HOW LIKELY ARE YOU TO NOD OFF OR FALL ASLEEP WHILE LYING DOWN TO REST IN THE AFTERNOON WHEN CIRCUMSTANCES PERMIT: HIGH CHANCE OF DOZING
HOW LIKELY ARE YOU TO NOD OFF OR FALL ASLEEP IN A CAR, WHILE STOPPED FOR A FEW MINUTES IN TRAFFIC: WOULD NEVER DOZE

## 2023-03-16 NOTE — PROGRESS NOTES
Type of service:  Video Visit    Video Start Time (time video started): 1400    Video End Time (time video stopped): 1447     Originating Location (pt. Location): Home    Distant Location (provider location):  Off-site    Mode of Communication:  Video Conference via [facility] Sleep Medicine Consultation:      Name: Max Schneider MRN# [id number]   Age: 22 year old YOB: 2000     Date of Consultation: March 16, 2023  Consultation is requested by: No referring provider defined for this encounter. No ref. provider found  Primary care provider: Farzaneh James       Reason for Sleep Consult:     Max Schneider is sent by No ref. provider found for a sleep consultation regarding nonrestorative sleep, would like to know if he has sleep apnea    Patient s Reason for visit  Max Schneider main reason for visit:   sleep apnea?   Patient states problem(s) started:   6-8 years ago  Max Schneider's goals for this visit:   r/o oneyda           Assessment and Plan:     Summary Sleep Diagnoses:  Sleep-related breathing disorder  Insomnia  Excessive daytime sleepiness      Comorbid Diagnoses:  Obesity, class III  Elevated blood pressure, without diagnosis of hypertension    Summary Recommendations:  Orders Placed This Encounter   Procedures     Comprehensive Sleep Study   1.  Recommend patient undergo sleep study.  Discussed benefits and limitations of PSG versus HST, and patient opted for the PSG version.  PSG was ordered.  Patient's STOP-BANG score is 7/8 indicating a high likelihood of obstructive sleep apnea.  2.  Patient will follow-up two weeks after sleep study has been completed to discuss the results and develop a plan of care going forward.  Patient is posing no barriers to CPAP therapy.  3.  Recommend patient evaluate his sleep hygiene practices and modify them to mitigate any further sleep disturbance.  4.  Recommend patient retire to sleep at a consistent time 7 days a week and a rise  close to the same time every day as well.  Minimize naps.  5.  Recommend do not drive a car if you are drowsy    Summary Counselin.  Discussed pathophysiology of central sleep apnea as well as obstructive sleep apnea  2.  Discussed process of obtaining a PSG  3.  Discussed sequelae of untreated sleep apnea pertinent to his medical diagnoses including depression and anxiety, obesity  4.  Discussed sleep hygiene practices and methods for improvement  5.  Discussed effects of weight gain, weight loss, alcohol intake on sleep apnea  6.  Recommend patient not drive a car if drowsy.    Medical Decision-making:   Educational materials provided in his after visit instructions    Total time spent reviewing medical records, history and physical examination, review of previous testing and interpretation as well as documentation on this date: 60 minutes    CC: No ref. provider found          History of Present Illness:     Max Schneider is a pleasant 22-year-old male who presents to the sleep medicine clinic as a self-referral as he is dissatisfied with his sleep and finds it nonrefreshing.  Patient lives in his own home and has 2 roommates.  He does not have a bed partner.  Max has been told he has socially disruptive snoring, admits to snort arousals, awakens with headaches, nasal congestion, dry mouth/sore throat.  Patient also has excessive daytime sleepiness, taking a daily planned nap lasting  minutes and finds it not refreshing to him.  He is employed as a  and has a consistent diurnal behavior.  His work hours are 6:30 AM-3 PM.  He admits to being a restless sleeper, occasionally has nightmares/night terrors and prefers to sleep laterally.  Patient also is involved with the bariatric weight management program and is actively attempting to lose weight.  Unfortunately, he feels that his sleepiness does affect his functioning during the day.  He is aware that his weight plays a role  in his daytime sleepiness.  We did discuss the effects of his medications, namely phentermine as well as Topamax, affect his sleep.    Past Sleep Evaluations: None    SLEEP-WAKE SCHEDULE:     Work/School Days: Patient goes to school/work:   6:30 AM  Usually gets into bed at  11 PM-12 AM  Takes patient about   to fall asleep 30-60 minutes  Has trouble falling asleep   nights per week 7  Wakes up in the middle of the night   times.  0  Wakes up due to  N/A  He has trouble falling back asleep   times a week.  N/A  It usually takes   to get back to sleep N/A  Patient is usually up at  6:00 AM  Uses alarm:   yes    Weekends/Non-work Days/All Other Days:  Usually gets into bed at   2 AM  Takes patient about   to fall asleep 30-60  Patient is usually up at  11 AM-3 PM  Uses alarm:  No    Sleep Need  Patient gets    sleep on average 8   Patient thinks he needs about   sleep 8    Max Schneider prefers to sleep in this position(s):   Laterally  Patient states they do the following activities in bed:  Uses tablet, phone    Naps  Patient takes a purposeful nap   5 times a week and naps are usually    in duration  He feels better after a nap:  no  He dozes off unintentionally   days per week 5  Patient has had a driving accident or near-miss due to sleepiness/drowsiness:   No      SLEEP DISRUPTIONS:    Breathing/Snoring  Patient snores: Yes  Other people complain about his snoring:  Yes  Patient has been told he stops breathing in his sleep: Yes  He has issues with the following:      Movement:  Patient gets pain, discomfort, with an urge to move:   No  It happens when he is resting:   N/A  It happens more at night:   N/A  Patient has been told he kicks his legs at night:   No     Behaviors in Sleep:  Max Schneider has experienced the following behaviors while sleeping:    He has experienced sudden muscle weakness during the day:  No      Is there anything else you would like your sleep provider to know:      CAFFEINE  AND OTHER SUBSTANCES:    Patient consumes caffeinated beverages per day:    1  Last caffeine use is usually:   10 am  List of any prescribed or over the counter stimulants that patient takes:   none   List of any prescribed or over the counter sleep medication patient takes:   melatonin 10 mg nightly  List of previous sleep medications that patient has tried:   none  Patient drinks alcohol to help them sleep:   no  Patient drinks alcohol near bedtime:  no    Family History:  Patient has a family member been diagnosed with a sleep disorder:  Yes            SCALES:    EPWORTH SLEEPINESS SCALE      [place] Sleepiness Scale ( KANDIS Ashley  [id number]<br>ESS - [place]/English - Final version - 21 Nov 07 - [facility] [place].) 3/16/2023   Sitting and reading Moderate chance of dozing   Watching TV Slight chance of dozing   Sitting, inactive in a public place (e.g. a theatre or a meeting) Would never doze   As a passenger in a car for an hour without a break Slight chance of dozing   Lying down to rest in the afternoon when circumstances permit High chance of dozing   Sitting and talking to someone Would never doze   Sitting quietly after a lunch without alcohol Would never doze   In a car, while stopped for a few minutes in traffic Would never doze   [place] Score (MC) 7   [place] Score (Sleep) 7         INSOMNIA SEVERITY INDEX (BONITA)      Insomnia Severity Index (BONITA) 3/16/2023   Difficulty falling asleep 3   Difficulty staying asleep 1   Problems waking up too early 2   How SATISFIED/DISSATISFIED are you with your CURRENT sleep pattern? 3   How NOTICEABLE to others do you think your sleep problem is in terms of impairing the quality of your life? 4   How WORRIED/DISTRESSED are you about your current sleep problem? 3   To what extent do you consider your sleep problem to INTERFERE with your daily functioning (e.g. daytime fatigue, mood, ability to function at work/daily chores, concentration, memory, mood, etc.) CURRENTLY?  "4   BONITA Total Score 20       Guidelines for Scoring/Interpretation:  Total score categories:  0-7 = No clinically significant insomnia   8-14 = Subthreshold insomnia   15-21 = Clinical insomnia (moderate severity)  22-28 = Clinical insomnia (severe)  Used via courtesy of www.[place].va.gov with permission from Alonso Quinn PhD., [facility]      STOP BANG     STOP BANG Questionnaire (  2008, the American Society of Anesthesiologists, Inc. [employer].) 3/16/2023   B/P Clinic: -   BMI Clinic: 60         GAD7    No flowsheet data found.      CAGE-AID    No flowsheet data found.    CAGE-AID reprinted with permission from the [place] Medical Journal, DONAVON Pal. and AFIA Young, \"Conjoint screening questionnaires for alcohol and drug abuse\" [place] Medical Journal 94: 135-140, 1995.      PATIENT HEALTH QUESTIONNAIRE-9 (PHQ - 9)    No flowsheet data found.    Developed by Liliana Benitez, Ashley Murphy, Caio Melton and colleagues, with an educational darrick from [employer] No permission required to reproduce, translate, display or distribute.        Allergies:    No Known Allergies    Medications:    Current Outpatient Medications   Medication Sig Dispense Refill     loratadine (CLARITIN) 5 MG chewable tablet Take 5 mg by mouth daily       topiramate (TOPAMAX) 25 MG tablet 25 mg at bedtime for 1 week, 50 mg at bedtime for 1 week and 75 mg daily at bedtime thereafter 90 tablet 11     phentermine (ADIPEX-P) 15 MG capsule Take 1 capsule (15 mg) by mouth every morning (Patient not taking: Reported on 3/16/2023) 30 capsule 5     phentermine 15 MG capsule TAKE 1 CAPSULE BY MOUTH EVERY MORNING (Patient not taking: No sig reported) 30 capsule 0     topiramate (TOPAMAX) 25 MG tablet Take 3 tablets (75 mg) by mouth daily (Patient not taking: Reported on 2/14/2022) 90 tablet 5       Problem List:  Patient Active Problem List    Diagnosis Date Noted     Morbid obesity (H) 02/14/2022 " "    Priority: Medium        Past Medical/Surgical History:  No past medical history on file.  No past surgical history on file.    Social History:  Social History     Socioeconomic History     Marital status: Single     Spouse name: Not on file     Number of children: Not on file     Years of education: Not on file     Highest education level: Not on file   Occupational History     Not on file   Tobacco Use     Smoking status: Never     Smokeless tobacco: Never   Substance and Sexual Activity     Alcohol use: No     Drug use: No     Sexual activity: Not on file   Other Topics Concern     Not on file   Social History Narrative     Not on file     Social Determinants of Health     Financial Resource Strain: Not on file   Food Insecurity: Not on file   Transportation Needs: Not on file   Physical Activity: Not on file   Stress: Not on file   Social Connections: Not on file   Intimate Partner Violence: Not on file   Housing Stability: Not on file       Family History:  No family history on file.    Review of Systems:  A complete review of systems reviewed by me is negative with the exeption of what has been mentioned in the history of present illness.      Physical Examination:  Vitals: Ht 1.905 m (6' 3\")   Wt (!) 217.7 kg (480 lb)   BMI 60.00 kg/m    BMI= Body mass index is 60 kg/m .         Physical Exam  Constitutional:       General: He is not in acute distress.     Appearance: Normal appearance. He is obese.   HENT:      Head: Normocephalic and atraumatic.      Right Ear: External ear normal.      Left Ear: External ear normal.      Nose: Nose normal.      Mouth/Throat:      Mouth: Mucous membranes are moist.      Pharynx: Oropharynx is clear.   Eyes:      Conjunctiva/sclera: Conjunctivae normal.   Pulmonary:      Effort: Pulmonary effort is normal.   Musculoskeletal:      Cervical back: Normal range of motion and neck supple.   Neurological:      General: No focal deficit present.      Mental Status: He is alert " and oriented to person, place, and time.   Psychiatric:         Mood and Affect: Mood normal.         Behavior: Behavior normal.         Thought Content: Thought content normal.         Judgment: Judgment normal.     Physical Exam   Constitutional: No distress.   HENT:   Head: Normocephalic and atraumatic.   Right Ear: External ear normal.   Left Ear: External ear normal.   Nose: Nose normal.   Mouth/Throat: Mucous membranes are moist. Oropharynx is clear.   Eyes: Conjunctivae are normal.   Pulmonary/Chest: Effort normal.   Abdominal: Normal appearance.   Musculoskeletal:      Cervical back: Normal range of motion and neck supple.   Neurological: He is alert and oriented to person, place, and time.   Psychiatric: His behavior is normal. Mood, judgment and thought content normal.     Physical exam is limited by the nature of a virtual visit         Data: All pertinent previous laboratory data reviewed     No results for input(s): NA, POTASSIUM, CHLORIDE, CO2, ANIONGAP, GLC, BUN, CR, JOVANA in the last 24039 hours.    No results for input(s): WBC, RBC, HGB, HCT, MCV, MCH, MCHC, RDW, PLT in the last 79588 hours.    No results for input(s): PROTTOTAL, ALBUMIN, BILITOTAL, ALKPHOS, AST, ALT, BILIDIRECT in the last 06572 hours.    No results found for: TSH    No results found for: UAMP, UBARB, BENZODIAZEUR, UCANN, UCOC, OPIT, UPCP    No results found for: IRONSAT, [id number], ADALBERTO    No results found for: PH, PHARTERIAL, PO2, [id number], SAT, PCO2, HCO3, BASEEXCESS, CARLYN, BEB    @LABRCNTIPR(phv:4,pco2v:4,po2v:4,hco3v:4,preethi:4,o2per:4)@    Echocardiology: No results found for this or any previous visit (from the past 4320 hour(s)).    Chest x-ray: No results found for this or any previous visit from the past 365 days.      Chest CT: No results found for this or any previous visit from the past 365 days.      PFT: Most Recent [facility] Pulmonary Function Testing    No results found for: 20001  No results found for: 20002  No results  found for: 20003  No results found for: 20015  No results found for: 20016  No results found for: 20027  No results found for: 20028  No results found for: 20029  No results found for: 20079  No results found for: 20080  No results found for: 20081  No results found for: 20335  No results found for: 20105  No results found for: 20053  No results found for: 20054  No results found for: 20055      LAZARO Poon CNP 3/16/2023   Sleep Medicine    This note was written with the assistance of the Dragon voice-[employer] technology software. The final document, although reviewed, may contain errors. For corrections, please contact the office.

## 2023-03-16 NOTE — NURSING NOTE
Is the patient currently in the state of [place]? YES    Visit mode:VIDEO    If the visit is dropped, the patient can be reconnected by: VIDEO VISIT: Send to e-mail at: [email or web address]    Will anyone else be joining the visit? NO      How would you like to obtain your AVS? MyChart    Are changes needed to the allergy or medication list? NO    Reason for visit: SANDRA

## 2023-03-17 NOTE — PATIENT INSTRUCTIONS
"      MY TREATMENT INFORMATION FOR SLEEP APNEA-  Max Schneider    DOCTOR : LAZARO Poon CNP    Am I having a sleep study at a sleep center?  --->Due to normal delays, you will be contacted within 2-4 weeks to schedule    Am I having a home sleep study?  --->Watch the video for the device you are using:    -/drop off device-   https://www.RentFeeder.com/watch?v=yGGFBdELGhk    -Disposable device sent out require phone/computer application-   https://www.R[employer]Feeder.com/watch?v=BCce_vbiwxE      Frequently asked questions:  1. What is Obstructive Sleep Apnea (SANDRA)? SANDRA is the most common type of sleep apnea. Apnea means, \"without breath.\"  Apnea is most often caused by narrowing or collapse of the upper airway as muscles relax during sleep.   Almost everyone has occasional apneas. Most people with sleep apnea have had brief interruptions at night frequently for many years.  The severity of sleep apnea is related to how frequent and severe the events are.   2. What are the consequences of SANDRA? Symptoms include: feeling sleepy during the day, snoring loudly, gasping or stopping of breathing, trouble sleeping, and occasionally morning headaches or heartburn at night.  Sleepiness can be serious and even increase the risk of falling asleep while driving. Other health consequences may include development of high blood pressure and other cardiovascular disease in persons who are susceptible. Untreated SANDRA  can contribute to heart disease, stroke and diabetes.   3. What are the treatment options? In most situations, sleep apnea is a lifelong disease that must be managed with daily therapy. Medications are not effective for sleep apnea and surgery is generally not considered until other therapies have been tried. Your treatment is your choice . Continuous Positive Airway (CPAP) works right away and is the therapy that is effective in nearly everyone. An oral device to hold your jaw forward is usually the next most " reliable option. Other options include postioning devices (to keep you off your back), weight loss, and surgery including a tongue pacing device. There is more detail about some of these options below.  4. Are my sleep studies covered by insurance? Although we will request verification of coverage, we advise you also check in advance of the study to ensure there is coverage.    Important tips for those choosing CPAP and similar devices   Know your equipment:  CPAP is continuous positive airway pressure that prevents obstructive sleep apnea by keeping the throat from collapsing while you are sleeping. In most cases, the device is  smart  and can slowly self-adjusts if your throat collapses and keeps a record every day of how well you are treated-this information is available to you and your care team.  BPAP is bilevel positive airway pressure that keeps your throat open and also assists each breath with a pressure boost to maintain adequate breathing.  Special kinds of BPAP are used in patients who have inadequate breathing from lung or heart disease. In most cases, the device is  smart  and can slowly self-adjusts to assist breathing. Like CPAP, the device keeps a record of how well you are treated.  Your mask is your connection to the device. You get to choose what feels most comfortable and the staff will help to make sure if fits. Here: are some examples of the different masks that are available:       Key points to remember on your journey with sleep apnea:  Sleep study.  PAP devices often need to be adjusted during a sleep study to show that they are effective and adjusted right.  Good tips to remember: Try wearing just the mask during a quiet time during the day so your body adapts to wearing it. A humidifier is recommended for comfort in most cases to prevent drying of your nose and throat. Allergy medication from your provider may help you if you are having nasal congestion.  Getting settled-in. It takes  more than one night for most of us to get used to wearing a mask. Try wearing just the mask during a quiet time during the day so your body adapts to wearing it. A humidifier is recommended for comfort in most cases. Our team will work with you carefully on the first day and will be in contact within 4 days and again at 2 and 4 weeks for advice and remote device adjustments. Your therapy is evaluated by the device each day.   Use it every night. The more you are able to sleep naturally for 7-8 hours, the more likely you will have good sleep and to prevent health risks or symptoms from sleep apnea. Even if you use it 4 hours it helps. Occasionally all of us are unable to use a medical therapy, in sleep apnea, it is not dangerous to miss one night.   Communicate. Call our skilled team on the number provided on the first day if your visit for problems that make it difficult to wear the device. Over 2 out of 3 patients can learn to wear the device long-term with help from our team. Remember to call our team or your sleep providers if you are unable to wear the device as we may have other solutions for those who cannot adapt to mask CPAP therapy. It is recommended that you sleep your sleep provider within the first 3 months and yearly after that if you are not having problems.   Use it for your health. We encourage use of CPAP masks during daytime quiet periods to allow your face and brain to adapt to the sensation of CPAP so that it will be a more natural sensation to awaken to at night or during naps. This can be very useful during the first few weeks or months of adapting to CPAP though it does not help medically to wear CPAP during wakefulness and  should not be used as a strategy just to meet guidelines.  Take care of your equipment. Make sure you clean your mask and tubing using directions every day and that your filter and mask are replaced as recommended or if they are not working.     BESIDES CPAP, WHAT OTHER  THERAPIES ARE THERE?    Positioning Device  Positioning devices are generally used when sleep apnea is mild and only occurs on your back.This example shows a pillow that straps around the waist. It may be appropriate for those whose sleep study shows milder sleep apnea that occurs primarily when lying flat on one's back. Preliminary studies have shown benefit but effectiveness at home may need to be verified by a home sleep test. These devices are generally not covered by medical insurance.  Examples of devices that maintain sleeping on the back to prevent snoring and mild sleep apnea.    Belt type body positioner  http://Light Harmonic/    Electronic reminder  http://nightshifttherapy.com/            Oral Appliance  What is oral appliance therapy?  An oral appliance device fits on your teeth at night like a retainer used after having braces. The device is made by a specialized dentist and requires several visits over 1-2 months before a manufactured device is made to fit your teeth and is adjusted to prevent your sleep apnea. Once an oral device is working properly, snoring should be improved. A home sleep test may be recommended at that time if to determine whether the sleep apnea is adequately treated.       Some things to remember:  -Oral devices are often, but not always, covered by your medical insurance. Be sure to check with your insurance provider.   -If you are referred for oral therapy, you will be given a list of specialized dentists to consider or you may choose to visit the Web site of the American Academy of Dental Sleep Medicine  -Oral devices are less likely to work if you have severe sleep apnea or are extremely overweight.     More detailed information  An oral appliance is a small acrylic device that fits over the upper and lower teeth  (similar to a retainer or a mouth guard). This device slightly moves jaw forward, which moves the base of the tongue forward, opens the airway, improves breathing  for effective treat snoring and obstructive sleep apnea in perhaps 7 out of 10 people .  The best working devices are custom-made by a dental device  after a mold is made of the teeth 1, 2, 3.  When is an oral appliance indicated?  Oral appliance therapy is recommended as a first-line treatment for patients with primary snoring, mild sleep apnea, and for patients with moderate sleep apnea who prefer appliance therapy to use of CPAP4, 5. Severity of sleep apnea is determined by sleep testing and is based on the number of respiratory events per hour of sleep.   How successful is oral appliance therapy?  The success rate of oral appliance therapy in patients with mild sleep apnea is 75-80% while in patients with moderate sleep apnea it is 50-70%. The chance of success in patients with severe sleep apnea is 40-50%. The research also shows that oral appliances have a beneficial effect on the cardiovascular health of SANDRA patients at the same magnitude as CPAP therapy7.  Oral appliances should be a second-line treatment in cases of severe sleep apnea, but if not completely successful then a combination therapy utilizing CPAP plus oral appliance therapy may be effective. Oral appliances tend to be effective in a broad range of patients although studies show that the patients who have the highest success are females, younger patients, those with milder disease, and less severe obesity. 3, 6.   Finding a dentist that practices dental sleep medicine  Specific training is available through the American Academy of Dental Sleep Medicine for dentists interested in working in the field of sleep. To find a dentist who is educated in the field of sleep and the use of oral appliances, near you, visit the Web site of the American Academy of Dental Sleep Medicine.    References  1. Juanita et al. Objectively measured vs self-reported compliance during oral appliance therapy for sleep-disordered breathing. Chest 2013;  144(5): 6307-4297.  2. Esperanza et al. Objective measurement of compliance during oral appliance therapy for sleep-disordered breathing. Thorax 2013; 68(1): 91-96.  3. Shaquille et al. Mandibular advancement devices in 620 men and women with SANDRA and snoring: tolerability and predictors of treatment success. Chest 2004; 125: 2841-4475.  4. Coby, et al. Oral appliances for snoring and SANDRA: a review. Sleep 2006; 29: 244-262.  5. Pawan et al. Oral appliance treatment for SANDRA: an update. J Clin Sleep Med 2014; 10(2): 215-227.  6. Shay et al. Predictors of OSAH treatment outcome. J Dent Res 2007; 86: 1840-7352.      Weight Loss:    Weight loss is a long-term strategy that may improve sleep apnea in some patients.    Weight management is a personal decision and the decision should be based on your interest and the potential benefits.  If you are interested in exploring weight loss strategies, the following discussion covers the impact on weight loss on sleep apnea and the approaches that may be successful.    Being overweight does not necessarily mean you will have health consequences.  Those who have BMI over 35 or over 27 with existing medical conditions carries greater risk.   Weight loss decreases severity of sleep apnea in most people with obesity. For those with mild obesity who have developed snoring with weight gain, even 15-30 pound weight loss can improve and occasionally eliminate sleep apnea.  Structured and life-long dietary and health habits are necessary to lose weight and keep healthier weight levels.     Though there may be significant health benefits from weight loss, long-term weight loss is very difficult to achieve- studies show success with dietary management in less than 10% of people. In addition, substantial weight loss may require years of dietary control and may be difficult if patients have severe obesity. In these cases, surgical management may be considered.  Finally, older  individuals who have tolerated obesity without health complications may be less likely to benefit from weight loss strategies.      [unfilled]    Surgery:    Surgery for obstructive sleep apnea is considered generally only when other therapies fail to work. Surgery may be discussed with you if you are having a difficult time tolerating CPAP and or when there is an abnormal structure that requires surgical correction.  Nose and throat surgeries often enlarge the airway to prevent collapse.  Most of these surgeries create pain for 1-2 weeks and up to half of the most common surgeries are not effective throughout life.  You should carefully discuss the benefits and drawbacks to surgery with your sleep provider and surgeon to determine if it is the best solution for you.   More information  Surgery for SANDRA is directed at areas that are responsible for narrowing or complete obstruction of the airway during sleep.  There are a wide range of procedures available to enlarge and/or stabilize the airway to prevent blockage of breathing in the three major areas where it can occur: the palate, tongue, and nasal regions.  Successful surgical treatment depends on the accurate identification of the factors responsible for obstructive sleep apnea in each person.  A personalized approach is required because there is no single treatment that works well for everyone.  Because of anatomic variation, consultation with an examination by a sleep surgeon is a critical first step in determining what surgical options are best for each patient.  In some cases, examination during sedation may be recommended in order to guide the selection of procedures.  Patients will be counseled about risks and benefits as well as the typical recovery course after surgery. Surgery is typically not a cure for a person s SANDRA.  However, surgery will often significantly improve one s SANDRA severity (termed  success rate ).  Even in the absence of a cure, surgery  will decrease the cardiovascular risk associated with OSA7; improve overall quality of life8 (sleepiness, functionality, sleep quality, etc).      Palate Procedures:  Patients with SANDRA often have narrowing of their airway in the region of their tonsils and uvula.  The goals of palate procedures are to widen the airway in this region as well as to help the tissues resist collapse.  Modern palate procedure techniques focus on tissue conservation and soft tissue rearrangement, rather than tissue removal.  Often the uvula is preserved in this procedure. Residual sleep apnea is common in patient after pharyngoplasty with an average reduction in sleep apnea events of 33%2.      Tongue Procedures:  ExamWhile patients are awake, the muscles that surround the throat are active and keep this region open for breathing. These muscles relax during sleep, allowing the tongue and other structures to collapse and block breathing.  There are several different tongue procedures available.  Selection of a tongue base procedure depends on characteristics seen on physical exam.  Generally, procedures are aimed at removing bulky tissues in this area or preventing the back of the tongue from falling back during sleep.  Success rates for tongue surgery range from 50-62%3.    Hypoglossal Nerve Stimulation:  Hypoglossal nerve stimulation has recently received approval from the [place] Food and Drug Administration for the treatment of obstructive sleep apnea.  This is based on research showing that the system was safe and effective in treating sleep apnea6.  Results showed that the median AHI score decreased 68%, from 29.3 to 9.0. This therapy uses an implant system that senses breathing patterns and delivers mild stimulation to airway muscles, which keeps the airway open during sleep.  The system consists of three fully implanted components: a small generator (similar in size to a pacemaker), a breathing sensor, and a stimulation lead.   Using a small handheld remote, a patient turns the therapy on before bed and off upon awakening.    Candidates for this device must be greater than 18 years of age, have moderate to severe SANDRA (AHI between 15-65), BMI less than 35, have tried CPAP/oral appliance for at least 8 weeks without success, and have appropriate upper airway anatomy (determined by a sleep endoscopy performed by Dr. Andrew James).    Hypoglossal Nerve Stimulation Pathway:    The sleep surgeon s office will work with the patient through the insurance prior-authorization process (including communications and appeals).    Nasal Procedures:  Nasal obstruction can interfere with nasal breathing during the day and night.  Studies have shown that relief of nasal obstruction can improve the ability of some patients to tolerate positive airway pressure therapy for obstructive sleep apnea1.  Treatment options include medications such as nasal saline, topical corticosteroid and antihistamine sprays, and oral medications such as antihistamines or decongestants. Non-surgical treatments can include external nasal dilators for selected patients. If these are not successful by themselves, surgery can improve the nasal airway either alone or in combination with these other options.      Combination Procedures:  Combination of surgical procedures and other treatments may be recommended, particularly if patients have more than one area of narrowing or persistent positional disease.  The success rate of combination surgery ranges from 66-80%2,3.    References  Petros MCNULTY. The Role of the Nose in Snoring and Obstructive Sleep Apnoea: An Update.  Eur Arch Otorhinolaryngol. 2011; 268: 1365-73.   Yasmin SM; Patricia JA; Oliver JR; Pallanch JF; Lizy MB; Malcolm SG; Sparkle BOSS. Surgical modifications of the upper airway for obstructive sleep apnea in adults: a systematic review and meta-analysis. SLEEP 2010;33(10):9587-0039. Levi CHU. Hypopharyngeal surgery in  obstructive sleep apnea: an evidence-based medicine review.  Arch Otolaryngol Head Neck Surg. 2006 Feb;132(2):206-13.  Pito YH1, Cecile Y, Tonio HOWARD. The efficacy of anatomically based multilevel surgery for obstructive sleep apnea. Otolaryngol Head Neck Surg. 2003 Oct;129(4):327-35.  Levi CHU, Goldberg A. Hypopharyngeal Surgery in Obstructive Sleep Apnea: An Evidence-Based Medicine Review. Arch Otolaryngol Head Neck Surg. 2006 Feb;132(2):206-13.  Kelli BEAVER et al. Upper-Airway Stimulation for Obstructive Sleep Apnea.  N Engl J Med. 2014 Jan 9;370(2):139-49.  Karey Y et al. Increased Incidence of Cardiovascular Disease in Middle-aged Men with Obstructive Sleep Apnea. Am J Respir Crit Care Med; 2002 166: 159-165  Batreskrystina HERNANDEZ et al. Studying Life Effects and Effectiveness of Palatopharyngoplasty (SLEEP) study: Subjective Outcomes of Isolated Uvulopalatopharyngoplasty. Otolaryngol Head Neck Surg. 2011; 144: 623-631.        WHAT IF I ONLY HAVE SNORING?    Mandibular advancement devices, lateral sleep positioning, long-term weight loss and treatment of nasal allergies have been shown to improve snoring.  Exercising tongue muscles with a game (https://Welcome Real-time.[employer]/us/rashad/soundly-reduce-snoring/nu7465590760) or stimulating the tongue during the day with a device (https://doi.org/10.3390/drz51656989) have improved snoring in some individuals.    Remember to Drive Safe... Drive Alive     Sleep health profoundly affects your health, mood, and your safety.  Thirty three percent of the population (one in three of us) is not getting enough sleep and many have a sleep disorder. Not getting enough sleep or having an untreated / undertreated sleep condition may make us sleepy without even knowing it. In fact, our driving could be dramatically impaired due to our sleep health. As your provider, here are some things I would like you to know about driving:     Here are some warning signs for impairment and dangerous drowsy driving:               -Having been awake more than 16 hours               -Looking tired               -Eyelid drooping              -Head nodding (it could be too late at this point)              -Driving for more than 30 minutes     Some things you could do to make the driving safer if you are experiencing some drowsiness:              -Stop driving and rest              -Call for transportation              -Make sure your sleep disorder is adequately treated     Some things that have been shown NOT to work when experiencing drowsiness while driving:              -Turning on the radio              -Opening windows              -Eating any  distracting  /  entertaining  foods (e.g., sunflower seeds, candy, or any other)              -Talking on the phone      Your decision may not only impact your life, but also the life of others. Please, remember to drive safe for yourself and all of us.

## 2023-06-01 ENCOUNTER — HOSPITAL ENCOUNTER (OUTPATIENT)
Dept: GENERAL RADIOLOGY | Facility: HOSPITAL | Age: 23
Discharge: HOME | End: 2023-06-01
Attending: NURSE PRACTITIONER | Admitting: NURSE PRACTITIONER
Payer: COMMERCIAL

## 2023-06-01 ENCOUNTER — OFFICE VISIT (OUTPATIENT)
Dept: FAMILY MEDICINE | Facility: CLINIC | Age: 23
End: 2023-06-01
Payer: COMMERCIAL

## 2023-06-01 VITALS
BODY MASS INDEX: 56.62 KG/M2 | WEIGHT: 315 LBS | OXYGEN SATURATION: 97 % | HEART RATE: 82 BPM | SYSTOLIC BLOOD PRESSURE: 156 MMHG | DIASTOLIC BLOOD PRESSURE: 93 MMHG | TEMPERATURE: 98.2 F | RESPIRATION RATE: 18 BRPM

## 2023-06-01 DIAGNOSIS — R06.2 WHEEZING: ICD-10-CM

## 2023-06-01 DIAGNOSIS — J40 BRONCHITIS: Primary | ICD-10-CM

## 2023-06-01 DIAGNOSIS — R50.9 FEVER, UNSPECIFIED FEVER CAUSE: ICD-10-CM

## 2023-06-01 PROCEDURE — 99214 OFFICE O/P EST MOD 30 MIN: CPT | Performed by: NURSE PRACTITIONER

## 2023-06-01 PROCEDURE — 71046 X-RAY EXAM CHEST 2 VIEWS: CPT

## 2023-06-01 PROCEDURE — 87635 SARS-COV-2 COVID-19 AMP PRB: CPT | Performed by: NURSE PRACTITIONER

## 2023-06-01 RX ORDER — ALBUTEROL SULFATE 90 UG/1
2 AEROSOL, METERED RESPIRATORY (INHALATION) ONCE
Status: COMPLETED | OUTPATIENT
Start: 2023-06-01 | End: 2023-06-01

## 2023-06-01 RX ADMIN — ALBUTEROL SULFATE 2 PUFF: 90 INHALANT RESPIRATORY (INHALATION) at 12:07

## 2023-06-01 ASSESSMENT — ENCOUNTER SYMPTOMS
SORE THROAT: 0
FEVER: 1

## 2023-06-01 NOTE — LETTER
June 1, 2023      Max Schneider  [address] [place] [address]        To Whom It May Concern:    Max Schneider was seen in our clinic. He may return to work without restrictions.      Sincerely,        Ragini Bird, CNP

## 2023-06-01 NOTE — PROGRESS NOTES
Assessment & Plan     Wheezing    - albuterol (PROVENTIL HFA/VENTOLIN HFA) inhaler  - XR Chest 2 Views  - Symptomatic COVID-19 Virus (Coronavirus) by PCR Nose  -If he feels it is helpful, can continue use of the albuterol inhaler 2 puffs up to 4 times daily as needed.  Fever, unspecified fever cause    - XR Chest 2 Views    Bronchitis    - Symptomatic COVID-19 Virus (Coronavirus) by PCR Nose       Focused exam and history done due to COVID-19 pandemic in a walk-in setting.      History, exam, and vital signs consistent with a viral URI.  Noted to have wheezing on exam, especially on the right side.  Did do a trial of 2 puffs from albuterol inhaler which improved the wheezing slightly, but due to the presence of the coarse wheezing with possible fever starting around day 3 of illness, did do an x-ray which is negative.    No red flags such as significant shortness of breath or chest pain.  Is likely a viral bronchitis.    Recheck if shortness of breath or new fevers develop.  Rest.     OTCs recommended: None [   ].  Dextromethorphan  [x  ], guaifenesin [ x ], pseudoephedrine [   ], Afrin for no greater than 3 days [  ], Tylenol or ibuprofen [ x ].                Return in about 1 week (around 6/8/2023) for If no better.    Ragini Bird, [facility]    Arian Santana is a 22 year old male who presents to clinic today for the following health issues:  Chief Complaint   Patient presents with     Cough     X 1 week, coughing up phlem, deep cough, nasal congestion, loss of appetite, slight fatigue.      HPI    Cough with greenish phlegm starting 5 days ago.  Did start with a sore throat for couple of days and then started coughing.  Believes he may have had a fever on around day 4 of the illness, but did not measure.     Congestion.      Subjective fever a couple days ago.      No shortness of breath.      No asthma hx.     Taking mucinex - helps a bit - and vitamin C.      Seasonal  allergies.  Hasn't been on claritin.      Has not had a COVID test.        Review of Systems   Constitutional: Positive for fever (Subjective-see HPI).   HENT: Negative for ear pain and sore throat.            Objective    BP (!) 156/93 (BP Location: Right arm, Patient Position: Sitting, Cuff Size: Adult Regular)   Pulse 82   Temp 98.2  F (36.8  C) (Oral)   Resp 18   Wt (!) 205.5 kg (453 lb)   SpO2 97%   BMI 56.62 kg/m    Physical Exam  Constitutional:       Appearance: He is well-developed.   HENT:      Right Ear: External ear normal.      Left Ear: External ear normal.   Eyes:      General:         Right eye: No discharge.         Left eye: No discharge.      Conjunctiva/sclera: Conjunctivae normal.   Cardiovascular:      Rate and Rhythm: Normal rate and regular rhythm.      Pulses: Normal pulses.      Heart sounds: Normal heart sounds.   Pulmonary:      Effort: Pulmonary effort is normal.      Breath sounds: Wheezing (Coarse, expiratory wheezing, right greater than left) present.   Musculoskeletal:         General: Normal range of motion.   Skin:     General: Skin is warm.   Neurological:      Mental Status: He is alert and oriented to person, place, and time.   Psychiatric:         Mood and Affect: Mood normal.         Behavior: Behavior normal.         Thought Content: Thought content normal.         Judgment: Judgment normal.        Results for orders placed or performed during the hospital encounter of 06/01/23   XR Chest 2 Views     Status: None    Narrative    EXAM: XR CHEST 2 VIEWS  LOCATION: [facility]  DATE/TIME: 6/1/2023 12:50 PM CDT    INDICATION: Fever on day 3 of illness, abnormal lung sounds on the right.  COMPARISON: None.      Impression    IMPRESSION: Negative chest. Lungs are clear.         X-ray reviewed by Ragini Bird, TILA  Negative

## 2023-06-01 NOTE — PATIENT INSTRUCTIONS
You are experiencing common virus symptoms.     Bronchitis is a type of virus in most cases that causes wheezing.  Your x-ray is normal.  No pneumonia.    Bronchitis may take longer to resolve than the average cold, 2 weeks or so.  See handout for more details.    If you feel it is helping, you may take 2 puffs up to 4 times daily as needed from your inhaler that we gave you today.    Try over-the-counter cough and cold medication as needed such as Robitussin, ibuprofen for discomfort.     Recheck if high fevers, shortness of breath or not better in about 1 week.    COVID test.  [email or web address].

## 2023-06-02 LAB — SARS-COV-2 RNA RESP QL NAA+PROBE: NEGATIVE

## 2023-06-03 ENCOUNTER — HEALTH MAINTENANCE LETTER (OUTPATIENT)
Age: 23
End: 2023-06-03

## 2023-06-23 ENCOUNTER — TRANSCRIBE ORDERS (OUTPATIENT)
Dept: OTHER | Age: 23
End: 2023-06-23

## 2023-06-23 DIAGNOSIS — F98.8 ATTENTION DEFICIT DISORDER: Primary | ICD-10-CM

## 2023-07-08 DIAGNOSIS — E66.01 MORBID OBESITY (H): ICD-10-CM

## 2023-07-10 RX ORDER — TOPIRAMATE 25 MG/1
TABLET, FILM COATED ORAL
Qty: 90 TABLET | Refills: 11 | OUTPATIENT
Start: 2023-07-10

## 2023-07-10 RX ORDER — PHENTERMINE HYDROCHLORIDE 15 MG/1
15 CAPSULE ORAL EVERY MORNING
Qty: 30 CAPSULE | Refills: 5 | OUTPATIENT
Start: 2023-07-10

## 2023-07-10 NOTE — TELEPHONE ENCOUNTER
phentermine (ADIPEX-P) 15 MG capsule  Last Written Prescription Date:  ?  Last Fill Quantity: ?,   # refills: ?  Last Office Visit :  5/16/2022  Future Office visit:  None  Routing refill request to provider for review/approval because:  Med refused  Pt needing updated office visit  Scheduling/clinic notified  Med not on updated med lists        topiramate (TOPAMAX) 25 MG tablet  Last Written Prescription Date: 2/14/2022  Last Fill Quantity: 90,   # refills: 11  Last Office Visit :  5/16/2022  Future Office visit:  None  Routing refill request to provider for review/approval because:  Med refused  Pt needs updated office visit  Scheduling/clinic notified    Cori Espinoza RN  Central Triage Red Flags/Med Refills

## 2023-07-23 ENCOUNTER — THERAPY VISIT (OUTPATIENT)
Dept: SLEEP MEDICINE | Facility: CLINIC | Age: 23
End: 2023-07-23
Payer: COMMERCIAL

## 2023-07-23 DIAGNOSIS — G47.30 SLEEP-RELATED BREATHING DISORDER: ICD-10-CM

## 2023-07-23 PROCEDURE — 95810 POLYSOM 6/> YRS 4/> PARAM: CPT | Performed by: INTERNAL MEDICINE

## 2023-07-23 ASSESSMENT — SLEEP AND FATIGUE QUESTIONNAIRES
HOW LIKELY ARE YOU TO NOD OFF OR FALL ASLEEP WHILE SITTING AND READING: MODERATE CHANCE OF DOZING
HOW LIKELY ARE YOU TO NOD OFF OR FALL ASLEEP WHILE SITTING INACTIVE IN A PUBLIC PLACE: WOULD NEVER DOZE
HOW LIKELY ARE YOU TO NOD OFF OR FALL ASLEEP WHILE WATCHING TV: SLIGHT CHANCE OF DOZING
HOW LIKELY ARE YOU TO NOD OFF OR FALL ASLEEP WHILE SITTING AND TALKING TO SOMEONE: WOULD NEVER DOZE
HOW LIKELY ARE YOU TO NOD OFF OR FALL ASLEEP WHEN YOU ARE A PASSENGER IN A CAR FOR AN HOUR WITHOUT A BREAK: WOULD NEVER DOZE
HOW LIKELY ARE YOU TO NOD OFF OR FALL ASLEEP WHILE LYING DOWN TO REST IN THE AFTERNOON WHEN CIRCUMSTANCES PERMIT: HIGH CHANCE OF DOZING
HOW LIKELY ARE YOU TO NOD OFF OR FALL ASLEEP IN A CAR, WHILE STOPPED FOR A FEW MINUTES IN TRAFFIC: WOULD NEVER DOZE
HOW LIKELY ARE YOU TO NOD OFF OR FALL ASLEEP WHILE SITTING QUIETLY AFTER LUNCH WITHOUT ALCOHOL: SLIGHT CHANCE OF DOZING

## 2023-07-24 LAB — SLPCOMP: NORMAL

## 2023-08-10 ENCOUNTER — VIRTUAL VISIT (OUTPATIENT)
Dept: SLEEP MEDICINE | Facility: CLINIC | Age: 23
End: 2023-08-10
Payer: COMMERCIAL

## 2023-08-10 VITALS — HEIGHT: 75 IN | WEIGHT: 315 LBS | BODY MASS INDEX: 39.17 KG/M2

## 2023-08-10 DIAGNOSIS — G47.33 OSA ON CPAP: Primary | ICD-10-CM

## 2023-08-10 PROCEDURE — 99214 OFFICE O/P EST MOD 30 MIN: CPT | Mod: VID | Performed by: NURSE PRACTITIONER

## 2023-08-10 ASSESSMENT — SLEEP AND FATIGUE QUESTIONNAIRES
HOW LIKELY ARE YOU TO NOD OFF OR FALL ASLEEP WHILE SITTING INACTIVE IN A PUBLIC PLACE: WOULD NEVER DOZE
HOW LIKELY ARE YOU TO NOD OFF OR FALL ASLEEP WHILE SITTING AND READING: WOULD NEVER DOZE
HOW LIKELY ARE YOU TO NOD OFF OR FALL ASLEEP WHILE WATCHING TV: SLIGHT CHANCE OF DOZING
HOW LIKELY ARE YOU TO NOD OFF OR FALL ASLEEP WHILE SITTING QUIETLY AFTER LUNCH WITHOUT ALCOHOL: SLIGHT CHANCE OF DOZING
HOW LIKELY ARE YOU TO NOD OFF OR FALL ASLEEP WHILE SITTING AND TALKING TO SOMEONE: WOULD NEVER DOZE
HOW LIKELY ARE YOU TO NOD OFF OR FALL ASLEEP IN A CAR, WHILE STOPPED FOR A FEW MINUTES IN TRAFFIC: WOULD NEVER DOZE
HOW LIKELY ARE YOU TO NOD OFF OR FALL ASLEEP WHILE LYING DOWN TO REST IN THE AFTERNOON WHEN CIRCUMSTANCES PERMIT: HIGH CHANCE OF DOZING
HOW LIKELY ARE YOU TO NOD OFF OR FALL ASLEEP WHEN YOU ARE A PASSENGER IN A CAR FOR AN HOUR WITHOUT A BREAK: SLIGHT CHANCE OF DOZING

## 2023-08-10 ASSESSMENT — PAIN SCALES - GENERAL: PAINLEVEL: NO PAIN (0)

## 2023-08-10 NOTE — NURSING NOTE
Is the patient currently in the state of [place]? YES    Visit mode:VIDEO    If the visit is dropped, the patient can be reconnected by: VIDEO VISIT: Text to cell phone: [phone]    Will anyone else be joining the visit? NO      How would you like to obtain your AVS? MyChart    Are changes needed to the allergy or medication list? NO    Reason for visit: JC Butler

## 2023-08-10 NOTE — PROGRESS NOTES
Virtual Visit Details    Type of service:  Video Visit   Video Start Time: 10:00 AM  Video End Time: 10:17 AM    Originating Location (pt. Location): Home    Distant Location (provider location):  Off-site  Platform used for Video Visit: Well

## 2023-08-10 NOTE — PROGRESS NOTES
Sleep Study Follow-Up Visit:    Date on this visit: 8/10/2023    Max Schneider comes in today for follow-up of his sleep study done on 7/23/2023 at the [facility] for possible sleep apnea.    Sleep latency 18.7 minutes without Ambien.  REM achieved.   REM latency was 333.0 minutes.  Sleep efficiency 71.7%. Total sleep time 334 minutes.    Sleep architecture:  Stage 1, 13.9% (5%), stage 2, 56.9% (45-55%), stage 3, 16.8% (15-20%), stage REM, 12.4% (20-25%).  AHI was 38.8, with desaturations down to 83%. RDI 49.4.  REM AHI 49.2, consistent with severe REM SANDRA.  Supine RDI 45.2, consistent with severe SUPINE SANDRA.  Periodic Limb Movement Index -/hour.       These findings were reviewed with patient.     Past medical/surgical history, family history, social history, medications and allergies were reviewed.      Problem List:  Patient Active Problem List    Diagnosis Date Noted    Morbid obesity (H) 02/14/2022     Priority: Medium        Impression/Plan:    Severe Obstructive Sleep Apnea   Ordered a trial of auto titrate CPAP at 6-18 cm H2O.  Explained to patient to use minimum 4 hours each day, 70% of the time.  Optimally, patient should use 100% of his sleep time in order to reach maximum benefit of therapy.   Patient to follow-up in clinic approximately 6 weeks after he receives his CPAP machine for an evaluation of efficacy and compliance.   Sleep Therapy Management Team   Patient to employ safe driving practices such as not driving of motor vehicle if he is drowsy   Weight management to BMI of 30.0      30 minutes spent with patient, all of which were spent face-to-face counseling, consulting, coordinating plan of care.      LAZARO Urbina, CNP  Sleep Medicine    This note was written with the assistance of the Dragon voice-dictation technology software. The final document, although reviewed, may contain errors. For corrections, please contact the office.      CC: Farzaneh James

## 2023-08-10 NOTE — PATIENT INSTRUCTIONS
Drive Safe... Drive Alive     Sleep health profoundly affects your health, mood, and your safety. 33% of the population (one in three of us) is not getting enough sleep and many have a sleep disorder. Not getting enough sleep or having an untreated / undertreated sleep condition may make us sleepy without even knowing it. In fact, our driving could be dramatically impaired due to our sleep health. As your provider, here are some things I would like you to know about driving:     Here are some warning signs for impairment and dangerous drowsy driving:              -Having been awake more than 16 hours               -Looking tired               -Eyelid drooping              -Head nodding (it could be too late at this point)              -Driving for more than 30 minutes     Some things you could do to make the driving safer if you are experiencing some drowsiness:              -Stop driving and rest              -Call for transportation              -Make sure your sleep disorder is adequately treated     Some things that have been shown NOT to work when experiencing drowsiness while driving:              -Turning on the radio              -Opening windows              -Eating any  distracting  /  entertaining  foods (e.g., sunflower seeds, candy, or any other)              -Talking on the phone      Your decision may not only impact your life, but also the life of others. Please, remember to drive safe for yourself and all of us. Your BMI is Body mass index is 59.37 kg/m .    What is BMI?  Body mass index (BMI) is one way to tell whether you are at a healthy weight, overweight, or obese. It measures your weight in relation to your height.  A BMI of 18.5 to 24.9 is in the healthy range. A person with a BMI of 25 to 29.9 is considered overweight, and someone with a BMI of 30 or greater is considered obese.  Another way to find out if you are at risk for health problems caused by overweight and obesity is to measure  your waist. If you are a woman and your waist is more than 35 inches, or if you are a man and your waist is more than 40 inches, your risk of disease may be higher.  More than two-thirds of American adults are considered overweight or obese. Being overweight or obese increases the risk for further weight gain.  Excess weight may lead to heart disease and diabetes. Creating and following plans for healthy eating and physical activity may help you improve your health.    Methods for maintaining or losing weight.  Weight control is part of healthy lifestyle and includes exercise, emotional health, and healthy eating habits.  Careful eating habits lifelong is the mainstay of weight control.  Though there are significant health benefits from weight loss, long-term weight loss with diet alone may be very difficult to achieve- studies show long-term success with dietary management in less than 10% of people. Attaining a healthy weight may be especially difficult to achieve in those with severe obesity. In some cases, medications, devices and surgical management might be considered.    What can you do?  If you are overweight or obese and are interested in methods for weight loss, you should discuss this with your provider. In addition, we recommend that you review healthy life styles and methods for weight loss available through the National Institutes of Health patient information sites:   http://win.niddk.nih.gov/publications/index.htm        Patient requests all Lab, Cardiology, and Radiology Results on their Discharge Instructions

## 2023-08-28 ENCOUNTER — TELEPHONE (OUTPATIENT)
Dept: ENDOCRINOLOGY | Facility: CLINIC | Age: 23
End: 2023-08-28

## 2023-08-28 ENCOUNTER — VIRTUAL VISIT (OUTPATIENT)
Dept: ENDOCRINOLOGY | Facility: CLINIC | Age: 23
End: 2023-08-28
Payer: COMMERCIAL

## 2023-08-28 VITALS — BODY MASS INDEX: 39.17 KG/M2 | HEIGHT: 75 IN | WEIGHT: 315 LBS

## 2023-08-28 DIAGNOSIS — E66.01 MORBID OBESITY (H): ICD-10-CM

## 2023-08-28 PROCEDURE — 99213 OFFICE O/P EST LOW 20 MIN: CPT | Mod: VID | Performed by: INTERNAL MEDICINE

## 2023-08-28 RX ORDER — PHENTERMINE HYDROCHLORIDE 15 MG/1
15 CAPSULE ORAL EVERY MORNING
Qty: 30 CAPSULE | Refills: 5 | Status: SHIPPED | OUTPATIENT
Start: 2023-08-28

## 2023-08-28 RX ORDER — TOPIRAMATE 25 MG/1
TABLET, FILM COATED ORAL
Qty: 90 TABLET | Refills: 11 | Status: SHIPPED | OUTPATIENT
Start: 2023-08-28

## 2023-08-28 ASSESSMENT — PAIN SCALES - GENERAL: PAINLEVEL: NO PAIN (0)

## 2023-08-28 NOTE — PROGRESS NOTES
"    Return Medical Weight Management Note     Max Schneider  MRN:  [id number]  :  2000  LUIS MANUEL:  2022    Dear Farznaeh James PA-C,    I had the pleasure of seeing your patient Max Schneider.  He is a 21 year old male who I am continuing to see for treatment of obesity related to:      2022     2:21 PM   --   I have the following health issues associated with obesity High Blood Pressure    Sleep Apnea    Lymphedema   I have the following symptoms associated with obesity Depression    Fatigue     CURRENT WEIGHT:   470 lbs 0 oz    Wt Readings from Last 4 Encounters:   21 (!) 213.2 kg (470 lb)   08/10/23 (!) 215.5 kg (475 lb)   21 (!) 205.5 kg (453 lb)   23 (!) 217.7 kg (480 lb)     Height:  6' 3\"  Body Mass Index:  Body mass index is 58.75 kg/m .  Vitals:  B/P: Data Unavailable, P: Data Unavailable    Initial consult weight was 515 on 22.  Weight change since last seen on 2022 is up 11 pounds.   Total loss is 45 pounds.    INTERVAL HISTORY:  Moved out on own, says can have better diet. Suffers with consistency on diet and meds.Has not taken meds at all since 2022. Tolerating meds and when takes them feels substantial benefit.        2022     2:21 PM   Diet Recall Review with Patient   Do you typically eat breakfast? Yes   If you do eat breakfast, what types of food do you eat? [place] or leftovers   Do you typically eat lunch? Yes   If you do eat lunch, what types of food do you typically eat? I usually order, it consists of a lot of different types. Usually I prefer Mexican Food.   Do you typically eat supper? Yes   If you do eat supper, what types of food do you typically eat? Something involving meat, either chicken or beef of some sort alongside a potential potato side dish. Also sometimes pasta.   Do you typically eat snacks? Yes   If you do snack, what types of food do you typically eat? I try to keep snacking healthy, but I like sun chips or fruit snacks. "   Do you like vegetables?  Yes   Do you drink water? Yes   How many glasses of juice do you drink in a typical day? 2   How many of glasses of milk do you drink in a typical day? 0   If you do drink milk, what type? Whole   How many 8oz glasses of sugar containing drinks such as Boris-Aid/sweet tea do you drink in a day? 1   How many cans/bottles of sugar pop/soda/tea/sports drinks do you drink in a day? 2   How often do you have a drink of alcohol? Monthly or Less   If you do drink, how many drinks might you have in a day? 3-4     MEDICATIONS:   Current Outpatient Medications   Medication    loratadine (CLARITIN) 5 MG chewable tablet    topiramate (TOPAMAX) 25 MG tablet    zolpidem (AMBIEN) 10 MG tablet     No current facility-administered medications for this visit.         8/28/2023     9:34 AM   Weight Loss Medication History Reviewed With Patient   Which weight loss medications are you currently taking on a regular basis? Qysmia (phentermine/topiramate)   If you are not taking a weight loss medication that was prescribed to you, please indicate why: The cost is too much for me    It did not seem to be helping me   Are you having any side effects from the weight loss medication that we have prescribed you? No     ASSESSMENT:   Maintain phentermine 15/d and topiramate 75 HS at current doses, encourage food life change.    FOLLOW-UP:    12 weeks.    Sincerely,  Alonso Moss MD

## 2023-08-28 NOTE — TELEPHONE ENCOUNTER
Central Prior Authorization Team   Phone: [phone]    PRIOR AUTHORIZATION DENIED    Medication: PHENTERMINE HCL 15 MG PO CAPS  Insurance Company: [employer] [place] - Phone [phone] Fax [phone]  Denial Date: 8/28/2023  Denial Rational: WEIGHT LOSS MEDICATIONS ARE NOT COVERED BY PATIENT'S RX BENEFITS      Appeal Information: IF PROVIDER WOULD LIKE TO APPEAL THIS DECISION PLEASE PROVIDE PA TEAM WITH LETTER OF MEDICAL NECESSITY        Patient Notified: No

## 2023-08-28 NOTE — PROGRESS NOTES
Virtual Visit Details    Joined the call at 8/28/2023, 9:50:57 am.  Left the call at 8/28/2023, 9:58:56 am.    Originating Location (pt. Location): Home    Distant Location (provider location):  Off-site  Platform used for Video Visit: Amarilis

## 2023-08-28 NOTE — LETTER
"2023       RE: Max Schneider  [address] [place] [address]     Dear Colleague,    Thank you for referring your patient, Max Schneider, to the [facility] WEIGHT MANAGEMENT CLINIC [place] at [facility]. Please see a copy of my visit note below.    Virtual Visit Details    Joined the call at 2023, 9:50:57 am.  Left the call at 2023, 9:58:56 am.    Originating Location (pt. Location): Home    Distant Location (provider location):  Off-site  Platform used for Video Visit: [facility] Medical Weight Management Note     Max Schneider  MRN:  [id number]  :  2000  LUIS MANUEL:  2022    Dear Farzaneh James PA-C,    I had the pleasure of seeing your patient Max Schneider.  He is a 21 year old male who I am continuing to see for treatment of obesity related to:      2022     2:21 PM   --   I have the following health issues associated with obesity High Blood Pressure    Sleep Apnea    Lymphedema   I have the following symptoms associated with obesity Depression    Fatigue     CURRENT WEIGHT:   470 lbs 0 oz    Wt Readings from Last 4 Encounters:   21 (!) 213.2 kg (470 lb)   08/10/23 (!) 215.5 kg (475 lb)   21 (!) 205.5 kg (453 lb)   21 (!) 217.7 kg (480 lb)     Height:  6' 3\"  Body Mass Index:  Body mass index is 58.75 kg/m .  Vitals:  B/P: Data Unavailable, P: Data Unavailable    Initial consult weight was 515 on 22.  Weight change since last seen on 2022 is up 11 pounds.   Total loss is 45 pounds.    INTERVAL HISTORY:  Moved out on own, says can have better diet. Suffers with consistency on diet and meds.Has not taken meds at all since 2022. Tolerating meds and when takes them feels substantial benefit.        2022     2:21 PM   Diet Recall Review with Patient   Do you typically eat breakfast? Yes   If you do eat breakfast, what types of food do you eat? [place] or leftovers   Do you " typically eat lunch? Yes   If you do eat lunch, what types of food do you typically eat? I usually order, it consists of a lot of different types. Usually I prefer Mexican Food.   Do you typically eat supper? Yes   If you do eat supper, what types of food do you typically eat? Something involving meat, either chicken or beef of some sort alongside a potential potato side dish. Also sometimes pasta.   Do you typically eat snacks? Yes   If you do snack, what types of food do you typically eat? I try to keep snacking healthy, but I like sun chips or fruit snacks.   Do you like vegetables?  Yes   Do you drink water? Yes   How many glasses of juice do you drink in a typical day? 2   How many of glasses of milk do you drink in a typical day? 0   If you do drink milk, what type? Whole   How many 8oz glasses of sugar containing drinks such as Boris-Aid/sweet tea do you drink in a day? 1   How many cans/bottles of sugar pop/soda/tea/sports drinks do you drink in a day? 2   How often do you have a drink of alcohol? Monthly or Less   If you do drink, how many drinks might you have in a day? 3-4     MEDICATIONS:   Current Outpatient Medications   Medication    loratadine (CLARITIN) 5 MG chewable tablet    topiramate (TOPAMAX) 25 MG tablet    zolpidem (AMBIEN) 10 MG tablet     No current facility-administered medications for this visit.         8/28/2023     9:34 AM   Weight Loss Medication History Reviewed With Patient   Which weight loss medications are you currently taking on a regular basis? Qysmia (phentermine/topiramate)   If you are not taking a weight loss medication that was prescribed to you, please indicate why: The cost is too much for me    It did not seem to be helping me   Are you having any side effects from the weight loss medication that we have prescribed you? No     ASSESSMENT:   Maintain phentermine 15/d and topiramate 75 HS at current doses, encourage food life change.    FOLLOW-UP:    12  weeks.    Sincerely,  Alonso Moss MD

## 2023-08-28 NOTE — TELEPHONE ENCOUNTER
As stated below, the PA was denied. If an appeal is to be initiated please provide a letter of medical necessity and I can fax it to the patient's insurance. If not, please close encounter.    Thank You!    Jordon Singleton [facility] Liaison  [facility] [email or web address]  Phone: [phone]  Fax: [phone]

## 2023-08-28 NOTE — NURSING NOTE
Is the patient currently in the state of [place]? YES    Visit mode:VIDEO    If the visit is dropped, the patient can be reconnected by: VIDEO VISIT: Text to cell phone:   Telephone Information:   Mobile [phone]       Will anyone else be joining the visit? NO  (If patient encounters technical issues they should call [phone]    How would you like to obtain your AVS? MyChart    Are changes needed to the allergy or medication list? No, Pt stated no changes to allergies, and Pt stated no med changes    Reason for visit: RECHECK (JENN)    Mee HULL

## 2023-08-29 NOTE — TELEPHONE ENCOUNTER
Patient notified of denial of phentermine.  Instructed patient to see if it would be affordable for him to pay out of pocket.

## 2023-09-14 ENCOUNTER — TRANSCRIBE ORDERS (OUTPATIENT)
Dept: OTHER | Age: 23
End: 2023-09-14

## 2023-09-14 DIAGNOSIS — R41.840 ATTENTION DEFICIT: Primary | ICD-10-CM

## 2023-09-18 ENCOUNTER — TELEPHONE (OUTPATIENT)
Dept: NEUROPSYCHOLOGY | Facility: CLINIC | Age: 23
End: 2023-09-18
Payer: COMMERCIAL

## 2023-09-18 NOTE — TELEPHONE ENCOUNTER
Neuropsychology referral declined. Ref prov, Farzaneh James PA-C at [facility] notified via fax. Unfortunately, our clinic does not see patients for diagnostic questions related to ADHD. In short, we do not currently perform assessments solely for the retrospective diagnosis of developmental conditions in adults.     Community neuropsychologists and psychologists include:    - [facility] - with locations throughout the [facility] area: [phone].    - [facility] - with locations throughout the [facility] area: [phone].    Other resources for patients:    - [email or web address] - you can search for providers in your community who offer ADHD assessments.    - Contact your insurance company for additional options.    Kenya aSntiago  Psychometrist

## 2023-09-26 ENCOUNTER — DOCUMENTATION ONLY (OUTPATIENT)
Dept: SLEEP MEDICINE | Facility: CLINIC | Age: 23
End: 2023-09-26
Payer: COMMERCIAL

## 2023-09-26 ENCOUNTER — DOCUMENTATION ONLY (OUTPATIENT)
Dept: SLEEP MEDICINE | Facility: CLINIC | Age: 23
End: 2023-09-26

## 2023-09-26 DIAGNOSIS — G47.33 OBSTRUCTIVE SLEEP APNEA (ADULT) (PEDIATRIC): Primary | ICD-10-CM

## 2023-09-26 NOTE — PROGRESS NOTES
Patient was offered choice of vendor and chose [facility].  Patient Max Schneider was set up at [facility] on September 26, 2023. Patient received a Resmed Airsense 10 Pressures were set at  6-20 cm H2O.   Patient s ramp is 4 cm H2O for Auto and FLEX/EPR is EPR, 3.  Patient received a [employer] Mask name: Vitera Full Face mask size Medium, heated tubing and heated humidifier.  Patient has the following compliance requirements: using and visit requirements.  Patient will make a follow up with Nilam Brumfield CNP.    YAMILKA DENIS

## 2023-09-29 ENCOUNTER — DOCUMENTATION ONLY (OUTPATIENT)
Dept: SLEEP MEDICINE | Facility: CLINIC | Age: 23
End: 2023-09-29
Payer: COMMERCIAL

## 2023-09-29 NOTE — PROGRESS NOTES
3 day Sleep therapy management telephone visit    Diagnostic AHI: 38.8  PSG    Confirmed with patient at time of call- Yes Patient is still interested in STM service       Subjective measures:  Pt reports that he is doing well with adjusting to CPAP. He feels more energy. Pt was given my contact information for further questions and concerns.       Order settings:  CPAP MIN CPAP MAX   6 cm H2O 20 cm H2O       Device settings:  CPAP MIN CPAP MAX EPR RESMED SOFT RESPONSE SETTING   6.0 cm  H20 20.0 cm  H20 THREE OFF       Compliance 66 %    Assessment: Nighty usage most nights over four hours      Action plan: Patient to have 14 day STM visit. Patient has a follow up visit scheduled:   no and not required by insurance    Replacement device: No  STM ordered by provider: Yes     Total time spent on accessing and  interpreting remote patient PAP therapy data  10 minutes    Total time spent counseling, coaching  and reviewing PAP therapy data with patient  4 minutes    [id number] no

## 2023-10-18 ENCOUNTER — DOCUMENTATION ONLY (OUTPATIENT)
Dept: SLEEP MEDICINE | Facility: CLINIC | Age: 23
End: 2023-10-18
Payer: COMMERCIAL

## 2023-10-18 NOTE — PROGRESS NOTES
14  DAY STM VISIT    Diagnostic AHI: 38.8  PSG    Subjective measures: Pt reports he is getting used to CPAP but has some discomfort with the mask. He will reach out to [facility]. Discussed desensitization Pt was given my contact information and instructed to reach out with any questions or concerns.       Assessment: Pt not meeting objective benchmarks for compliance  Patient failing following subjective benchmarks: mask discomfort     Action plan: pt to have 30 day STM visit.      Device type: Auto-CPAP    PAP settings:  DEVICE TYPE CPAP MIN CPAP MAX 95TH % PRESSURE EPR MASK DISPENSED   Auto-CPAP    6.0 cm  H20 20.0 cm  H20 10.5 cm  H20  THREE Full Face Mask     Mask type:  Full Face Mask    Objective measures: 14 day rolling measures   COMPLIANCE LEAK AHI AVERAGE USE IN MINUTES   57 % 9.3 2.54 226   GOAL >70% GOAL < 24 LPM GOAL <5 GOAL >240      Patient has the following upcoming sleep appts:      Total time spent on accessing and interpreting remote patient PAP therapy data  10 minutes    Total time spent counseling, coaching  and reviewing PAP therapy data with patient  5 minutes    [id number]  [id number]  no (3 day STM)

## 2023-11-02 ENCOUNTER — DOCUMENTATION ONLY (OUTPATIENT)
Dept: SLEEP MEDICINE | Facility: CLINIC | Age: 23
End: 2023-11-02
Payer: COMMERCIAL

## 2023-11-02 NOTE — PROGRESS NOTES
30 DAY STM VISIT    Diagnostic AHI: 38.8  PSG    Subjective measures:   Spoke with patient he stated things going well with CPAP.  He is looking to get a different mask.      Assessment: Pt not meeting objective benchmarks for compliance  Patient failing following subjective benchmarks: mask discomfort   Action plan: pt to have 6 month STM visit  Patient has not scheduled a follow up visit.   Device type: Auto-CPAP  PAP settings: CPAP min 6.0 cm  H20     CPAP max 20.0 cm  H20    95th% pressure 10.8 cm  H20      RESMED EPR level Setting: THREE    RESMED Soft response setting:  ON  Mask type:  Full Face Mask  Objective measures: 14 day rolling measures      Compliance  42 %      Leak  21.3 lpm  last  upload      AHI 1.28   last  upload      Average number of minutes 207      Objective measure goal  Compliance   Goal >70%  Leak   Goal < 24 lpm  AHI  Goal < 5  Usage  Goal >240        Total time spent on accessing and interpreting remote patient PAP therapy data  10 minutes    Total time spent counseling, coaching  and reviewing PAP therapy data with patient  2 minutes     [id number] this call  [id number] no  at 3 or 14 day [facility]

## 2023-11-21 ENCOUNTER — VIRTUAL VISIT (OUTPATIENT)
Dept: SLEEP MEDICINE | Facility: CLINIC | Age: 23
End: 2023-11-21
Payer: COMMERCIAL

## 2023-11-21 VITALS — BODY MASS INDEX: 39.17 KG/M2 | HEIGHT: 75 IN | WEIGHT: 315 LBS

## 2023-11-21 DIAGNOSIS — G47.33 OSA (OBSTRUCTIVE SLEEP APNEA): Primary | ICD-10-CM

## 2023-11-21 PROCEDURE — 99213 OFFICE O/P EST LOW 20 MIN: CPT | Mod: VID

## 2023-11-21 RX ORDER — HYDROCHLOROTHIAZIDE 25 MG/1
1 TABLET ORAL
COMMUNITY
Start: 2023-08-29

## 2023-11-21 ASSESSMENT — PAIN SCALES - GENERAL: PAINLEVEL: NO PAIN (0)

## 2023-11-21 ASSESSMENT — SLEEP AND FATIGUE QUESTIONNAIRES
HOW LIKELY ARE YOU TO NOD OFF OR FALL ASLEEP WHEN YOU ARE A PASSENGER IN A CAR FOR AN HOUR WITHOUT A BREAK: SLIGHT CHANCE OF DOZING
HOW LIKELY ARE YOU TO NOD OFF OR FALL ASLEEP IN A CAR, WHILE STOPPED FOR A FEW MINUTES IN TRAFFIC: WOULD NEVER DOZE
HOW LIKELY ARE YOU TO NOD OFF OR FALL ASLEEP WHILE WATCHING TV: SLIGHT CHANCE OF DOZING
HOW LIKELY ARE YOU TO NOD OFF OR FALL ASLEEP WHILE SITTING INACTIVE IN A PUBLIC PLACE: WOULD NEVER DOZE
HOW LIKELY ARE YOU TO NOD OFF OR FALL ASLEEP WHILE SITTING QUIETLY AFTER LUNCH WITHOUT ALCOHOL: WOULD NEVER DOZE
HOW LIKELY ARE YOU TO NOD OFF OR FALL ASLEEP WHILE SITTING AND TALKING TO SOMEONE: WOULD NEVER DOZE
HOW LIKELY ARE YOU TO NOD OFF OR FALL ASLEEP WHILE LYING DOWN TO REST IN THE AFTERNOON WHEN CIRCUMSTANCES PERMIT: HIGH CHANCE OF DOZING
HOW LIKELY ARE YOU TO NOD OFF OR FALL ASLEEP WHILE SITTING AND READING: SLIGHT CHANCE OF DOZING

## 2023-11-21 NOTE — PATIENT INSTRUCTIONS
CPAP machines are often rent-to-own over 3-12 months depending on your insurance. During the first 3 months, insurances will often want to see at least 4 hours of use on 70% of nights over a 30 day period. Ideally, you would wear it whenever sleeping, but 4 hours is where health benefits really start.

## 2023-11-21 NOTE — PROGRESS NOTES
Virtual Visit Details    Type of service: Video Visit   Video Start Time: 2:58 PM  Video End Time: 3:08 PM  Originating Location (pt. Location): Other at work    Distant Location (provider location): On-site  Platform used for Video Visit: [facility]    Sleep Apnea - Follow-up Visit:    Impression/Plan:  (G47.33) SANDRA (obstructive sleep apnea) (primary encounter diagnosis)  Comment: Max presents for follow-up of his severe sleep apnea, managed with CPAP. He is here to document compliance with his machine received on 09/26/2023. Things had been going well with CPAP until the last two weeks. He thinks he had Covid and was struggling to wear his mask at night. When he first started using CPAP, he was feeling more rested during the day. He has not yet met compliance. He used his machine 13/30 days and only 20% of days >4 hours. He is tolerating the CPAP pressures. He is interested in a new mask as his tends to leak by his eyes. Besides low usage, his download looks good. His leak is acceptable, and his residual AHI is normal at 1.0 events/hr.   Plan: Continue auto-PAP 6-20 cm H2O. A prescription for new supplies will be written at his next visit. We reviewed compliance goals. Recommended scheduling a mask fitting appointment with [facility]. Informed him he can wear his CPAP during naps as well and that will count towards his 4 hours per day.     Max Schneider will follow up in about 1 month(s) to document compliance before his 90-day compliance period ends on 12/25/2023.     Total time spent reviewing medical records, history and physical examination, review of previous testing and interpretation as well as documentation on this date: 16 minutes    CC: Farzaneh James PA-C    History of Present Illness:  Chief Complaint   Patient presents with    RECHECK     Max Schneider presents for follow-up of his severe sleep apnea, managed with CPAP. He is here to document compliance with his machine received on  09/26/2023. Things had been going well with CPAP until the last two weeks. He thinks he had Covid and was struggling to wear his mask at night.     7/23/2023 [place] [facility] Sleep Study (480.0 lbs) - AHI 38.8, RDI 49.4, Supine AHI 45.2, REM AHI 49.2, Low O2 83.0%, Time Spent ?88% 4.5 minutes / Time Spent ?89% 8.3 minutes.    DME: [facility]    Do you use a CPAP Machine at home: Yes  Overall, on a scale of 0-10 how would you rate your CPAP (0 poor, 10 great):      What type of mask do you use: Full face mask size medium   Is your mask comfortable: Yes   If not, why:      Is your mask leaking: Yes   If yes, where do you feel it: He thinks through the top of the mask. He will get air into his eyes occasionally.   How many night per week does the mask leak (0-7):      Do you notice snoring with mask on: No  Do you notice gasping arousals with mask on: No  Are you having significant oral or nasal dryness: No  Is the pressure setting comfortable: Yes  If not, why:      What is your typical bedtime: 12-1 AM  How long does it take you to go to sleep on PAP therapy: It can take a while to fall asleep with CPAP so he will sometimes take Benadryl.   What time do you typically get out of bed for the day: 6:30-6:40 AM  How many hours on average per night are you using PAP therapy: 3-4 hours  How many hours are you sleeping per night: 6 hours?  Do you feel well rested in the morning: Yes, during the first few weeks of CPAP use he did feel more rested and had better energy throughout the day.     ResMed AirSense 10  Auto-PAP 6.0 - 20.0 cmH2O 30 day usage data: 10/22/2023-11/20/2023   20% of days with > 4 hours of use. 17/30 days with no use.   Average use 3 hours 46 minutes per day.   95%ile Leak 21.6 L/min.   CPAP 95% pressure 10.4 cm.   AHI 0.96 events per hour.     EPWORTH SLEEPINESS SCALE         11/21/2023     2:41 PM    [place] Sleepiness Scale ( [employer]  [id number]<br>ESS - [place]/English - Final version - 21 Nov 07 - [place]  "[employer]   Sitting and reading Slight chance of dozing   Watching TV Slight chance of dozing   Sitting, inactive in a public place (e.g. a theatre or a meeting) Would never doze   As a passenger in a car for an hour without a break Slight chance of dozing   Lying down to rest in the afternoon when circumstances permit High chance of dozing   Sitting and talking to someone Would never doze   Sitting quietly after a lunch without alcohol Would never doze   In a car, while stopped for a few minutes in traffic Would never doze   [place] Score (MC) 6   [place] Score (Sleep) 6       INSOMNIA SEVERITY INDEX (BONITA)          11/21/2023     2:40 PM   Insomnia Severity Index (BONITA)   Difficulty falling asleep 2   Difficulty staying asleep 0   Problems waking up too early 0   How SATISFIED/DISSATISFIED are you with your CURRENT sleep pattern? 2   How NOTICEABLE to others do you think your sleep problem is in terms of impairing the quality of your life? 2   How WORRIED/DISTRESSED are you about your current sleep problem? 3   To what extent do you consider your sleep problem to INTERFERE with your daily functioning (e.g. daytime fatigue, mood, ability to function at work/daily chores, concentration, memory, mood, etc.) CURRENTLY? 3   BONITA Total Score 12       Guidelines for Scoring/Interpretation:  Total score categories:  0-7 = No clinically significant insomnia   8-14 = Subthreshold insomnia   15-21 = Clinical insomnia (moderate severity)  22-28 = Clinical insomnia (severe)  Used via courtesy of [email or web address].va.gov with permission from Alonso Quinn PhD., [facility]      Past medical/surgical history, family history, social history, medications and allergies were reviewed.        Problem List:  Patient Active Problem List    Diagnosis Date Noted    Morbid obesity (H) 02/14/2022     Priority: Medium        Ht 1.905 m (6' 3\")   Wt (!) 204.1 kg (450 lb)   BMI 56.25 kg/m      LAZARO Angela CNP " 11/21/2023

## 2023-11-21 NOTE — NURSING NOTE
Has patient had flu shot for current/most recent flu season? If so, when? No    Is the patient currently in the state of [place]? YES    Visit mode:VIDEO    If the visit is dropped, the patient can be reconnected by: VIDEO VISIT: Text to cell phone:   Telephone Information:   Mobile [phone]       Will anyone else be joining the visit? NO  (If patient encounters technical issues they should call [phone]    How would you like to obtain your AVS? MyChart    Are changes needed to the allergy or medication list? No    Reason for visit: RECHECK    Liane HULL

## 2024-03-02 DIAGNOSIS — E66.01 MORBID OBESITY (H): ICD-10-CM

## 2024-03-02 RX ORDER — PHENTERMINE HYDROCHLORIDE 15 MG/1
15 CAPSULE ORAL EVERY MORNING
Qty: 30 CAPSULE | Refills: 5 | Status: CANCELLED | OUTPATIENT
Start: 2024-03-02

## 2024-03-03 NOTE — TELEPHONE ENCOUNTER
phentermine (ADIPEX-P) 15 MG capsule 30 capsule 5 8/28/2023 -- --   Sig - Route: Take 1 capsule (15 mg) by mouth every morning - Oral     ----------------------  Last Office Visit : 8/28/2023  [facility] Alonso Collazo MD     Future Office visit:  0  ----------------------    Routing refill request to provider for review/approval because:  Drug not on the [facility], [facility] or [facility] refill protocol or controlled substance

## 2024-03-04 NOTE — TELEPHONE ENCOUNTER
Refill denied at this time. Patient needs appointment with Dr. Moss. Amy message sent to patient to schedule appointment.

## 2024-07-13 ENCOUNTER — HEALTH MAINTENANCE LETTER (OUTPATIENT)
Age: 24
End: 2024-07-13

## 2025-07-19 ENCOUNTER — HEALTH MAINTENANCE LETTER (OUTPATIENT)
Age: 25
End: 2025-07-19